# Patient Record
Sex: FEMALE | Race: BLACK OR AFRICAN AMERICAN | NOT HISPANIC OR LATINO | Employment: UNEMPLOYED | URBAN - METROPOLITAN AREA
[De-identification: names, ages, dates, MRNs, and addresses within clinical notes are randomized per-mention and may not be internally consistent; named-entity substitution may affect disease eponyms.]

---

## 2023-03-06 PROBLEM — D64.9 ANEMIA: Status: ACTIVE | Noted: 2023-03-06

## 2023-03-06 PROBLEM — E66.813 OBESITY, CLASS III, BMI 40-49.9 (MORBID OBESITY): Status: ACTIVE | Noted: 2023-03-06

## 2023-03-06 PROBLEM — E07.9 THYROID DISORDER: Status: ACTIVE | Noted: 2023-03-06

## 2023-03-06 PROBLEM — J34.2 DEVIATED NASAL SEPTUM: Status: ACTIVE | Noted: 2023-03-06

## 2023-03-06 PROBLEM — R07.81 PLEURITIC CHEST PAIN: Status: ACTIVE | Noted: 2023-03-06

## 2023-03-06 PROBLEM — H52.11 MYOPIA OF RIGHT EYE: Status: ACTIVE | Noted: 2023-03-06

## 2023-03-06 PROBLEM — D21.9 FIBROID: Status: ACTIVE | Noted: 2023-03-06

## 2023-03-06 PROBLEM — I10 BENIGN ESSENTIAL HYPERTENSION: Status: ACTIVE | Noted: 2023-03-06

## 2023-03-06 PROBLEM — R21 SKIN RASH: Status: ACTIVE | Noted: 2023-03-06

## 2023-03-06 PROBLEM — N93.9 ABNORMAL UTERINE BLEEDING: Status: ACTIVE | Noted: 2023-03-06

## 2023-03-06 PROBLEM — D25.9 UTERINE FIBROID: Status: ACTIVE | Noted: 2023-03-06

## 2023-03-06 PROBLEM — J02.9 SORE THROAT: Status: ACTIVE | Noted: 2023-03-06

## 2023-03-06 PROBLEM — N76.0 VAGINITIS: Status: ACTIVE | Noted: 2023-03-06

## 2023-03-06 PROBLEM — N92.0 MENORRHAGIA: Status: ACTIVE | Noted: 2023-03-06

## 2023-03-06 PROBLEM — R05.9 COUGH: Status: ACTIVE | Noted: 2023-03-06

## 2023-03-06 PROBLEM — H02.403 ACQUIRED PTOSIS OF BOTH EYELIDS: Status: ACTIVE | Noted: 2023-03-06

## 2023-03-06 PROBLEM — R06.83 SNORING: Status: ACTIVE | Noted: 2023-03-06

## 2023-03-06 PROBLEM — R40.0 DAYTIME SOMNOLENCE: Status: ACTIVE | Noted: 2023-03-06

## 2023-03-06 PROBLEM — R09.81 NASAL CONGESTION: Status: ACTIVE | Noted: 2023-03-06

## 2023-03-06 PROBLEM — R03.0 ELEVATED BLOOD-PRESSURE READING, WITHOUT DIAGNOSIS OF HYPERTENSION: Status: ACTIVE | Noted: 2023-03-06

## 2023-03-06 PROBLEM — R53.83 FATIGUE: Status: ACTIVE | Noted: 2023-03-06

## 2023-03-06 PROBLEM — J34.3 HYPERTROPHY OF BOTH INFERIOR NASAL TURBINATES: Status: ACTIVE | Noted: 2023-03-06

## 2023-03-06 PROBLEM — Q10.0: Status: ACTIVE | Noted: 2023-03-06

## 2023-03-06 PROBLEM — D50.9 IRON DEFICIENCY ANEMIA, UNSPECIFIED: Status: ACTIVE | Noted: 2023-03-06

## 2023-03-06 PROBLEM — J20.9 BRONCHITIS WITH BRONCHOSPASM: Status: ACTIVE | Noted: 2023-03-06

## 2023-03-06 PROBLEM — E66.01 OBESITY, CLASS III, BMI 40-49.9 (MORBID OBESITY) (MULTI): Status: ACTIVE | Noted: 2023-03-06

## 2023-03-06 PROBLEM — J34.89 SINUS PRESSURE: Status: ACTIVE | Noted: 2023-03-06

## 2023-03-06 PROBLEM — R51.9 HEADACHE: Status: ACTIVE | Noted: 2023-03-06

## 2023-03-06 PROBLEM — J40 BRONCHITIS: Status: ACTIVE | Noted: 2023-03-06

## 2023-03-06 PROBLEM — H10.13 ALLERGIC CONJUNCTIVITIS, BILATERAL: Status: ACTIVE | Noted: 2023-03-06

## 2023-03-06 PROBLEM — E66.9 ADULT-ONSET OBESITY: Status: ACTIVE | Noted: 2023-03-06

## 2023-03-06 PROBLEM — U07.1 COVID-19: Status: ACTIVE | Noted: 2023-03-06

## 2023-03-06 PROBLEM — H52.202 ASTIGMATISM OF LEFT EYE: Status: ACTIVE | Noted: 2023-03-06

## 2023-03-06 PROBLEM — R73.03 PRE-DIABETES: Status: ACTIVE | Noted: 2023-03-06

## 2023-03-06 PROBLEM — G43.909 MIGRAINES: Status: ACTIVE | Noted: 2023-03-06

## 2023-03-06 PROBLEM — H04.209 EPIPHORA: Status: ACTIVE | Noted: 2023-03-06

## 2023-03-06 RX ORDER — SPIRONOLACTONE 50 MG/1
1 TABLET, FILM COATED ORAL DAILY
COMMUNITY
Start: 2021-08-11 | End: 2024-02-05 | Stop reason: SDUPTHER

## 2023-03-06 RX ORDER — CARVEDILOL 12.5 MG/1
1 TABLET ORAL 2 TIMES DAILY
COMMUNITY
Start: 2021-04-12 | End: 2023-06-08

## 2023-03-06 RX ORDER — ALBUTEROL SULFATE 90 UG/1
1-2 AEROSOL, METERED RESPIRATORY (INHALATION) EVERY 4 HOURS PRN
COMMUNITY
Start: 2015-12-21

## 2023-03-06 RX ORDER — LOSARTAN POTASSIUM AND HYDROCHLOROTHIAZIDE 25; 100 MG/1; MG/1
1 TABLET ORAL DAILY
COMMUNITY
Start: 2020-02-11

## 2023-03-06 RX ORDER — ERGOCALCIFEROL 1.25 MG/1
1 CAPSULE ORAL
COMMUNITY
Start: 2020-01-27 | End: 2024-06-03 | Stop reason: ENTERED-IN-ERROR

## 2023-03-06 RX ORDER — ACETAMINOPHEN 325 MG/1
650 TABLET ORAL EVERY 6 HOURS
COMMUNITY
Start: 2021-08-20 | End: 2024-06-03 | Stop reason: ENTERED-IN-ERROR

## 2023-03-06 RX ORDER — IBUPROFEN 200 MG
600 TABLET ORAL EVERY 6 HOURS PRN
COMMUNITY
Start: 2021-08-20 | End: 2024-06-05 | Stop reason: HOSPADM

## 2023-03-06 RX ORDER — FERROUS SULFATE 325(65) MG
1 TABLET ORAL 2 TIMES DAILY
COMMUNITY
Start: 2021-04-12

## 2023-03-08 ENCOUNTER — APPOINTMENT (OUTPATIENT)
Dept: PRIMARY CARE | Facility: CLINIC | Age: 38
End: 2023-03-08
Payer: COMMERCIAL

## 2023-03-10 ENCOUNTER — TELEMEDICINE (OUTPATIENT)
Dept: PRIMARY CARE | Facility: CLINIC | Age: 38
End: 2023-03-10
Payer: COMMERCIAL

## 2023-03-10 DIAGNOSIS — N92.1 MENORRHAGIA WITH IRREGULAR CYCLE: Primary | ICD-10-CM

## 2023-03-10 PROCEDURE — 99213 OFFICE O/P EST LOW 20 MIN: CPT | Performed by: INTERNAL MEDICINE

## 2023-03-10 ASSESSMENT — ENCOUNTER SYMPTOMS
BOWEL INCONTINENCE: 0
PARESIS: 0
BACK PAIN: 1
PERIANAL NUMBNESS: 0
NUMBNESS: 0
WEIGHT LOSS: 0
DYSURIA: 0
WEAKNESS: 0
HEADACHES: 0
LEG PAIN: 1
TINGLING: 0
PARESTHESIAS: 0
ABDOMINAL PAIN: 1
FEVER: 0

## 2023-03-10 NOTE — PROGRESS NOTES
Chief complaint - symptomatic anemia       Gretel Archibald is a 36 yo F presenting in FU.       1. HTN, severe range, HFpEF per 6.2022 TTE   -Coreg 25 BID   -losart-hctz 100-25   -amlo   -spirono 25    2. Fe Def anemia, menorrhagia s/p myomectomy 8/2021 ([facility]) now with recurrence of menorrhagia in interval   -has had severe bleeding --> with associated dizziness, lightheadedness and very frequend episodes of missed work   [ ]obtain repeat cbc, rfp   [ ]re-referral to gynecology     3. CAP 6/2022     4. ?Mild CKD with normal for race GFRs but mildly elev Cr since 2021       FMLA   weds 9.7.22 - 9.20.22   then 2 times a week intermittent in the past   Now requires more extended leave pending anemia workup   Will bring rosawquan shaw week       A/P   RTO for in person visit next Tuesday to obtain labs. Check BP, given number to schedule gynecology, and complete work related paperwork

## 2023-03-14 ENCOUNTER — APPOINTMENT (OUTPATIENT)
Dept: PRIMARY CARE | Facility: CLINIC | Age: 38
End: 2023-03-14
Payer: COMMERCIAL

## 2023-04-26 ENCOUNTER — APPOINTMENT (OUTPATIENT)
Dept: PRIMARY CARE | Facility: CLINIC | Age: 38
End: 2023-04-26
Payer: COMMERCIAL

## 2023-05-11 ENCOUNTER — TELEPHONE (OUTPATIENT)
Dept: PRIMARY CARE | Facility: CLINIC | Age: 38
End: 2023-05-11
Payer: COMMERCIAL

## 2023-05-12 DIAGNOSIS — I10 BENIGN ESSENTIAL HYPERTENSION: Primary | ICD-10-CM

## 2023-05-12 RX ORDER — AMLODIPINE BESYLATE 10 MG/1
10 TABLET ORAL DAILY
Qty: 30 TABLET | Refills: 5 | Status: SHIPPED | OUTPATIENT
Start: 2023-05-12 | End: 2024-06-03

## 2023-05-24 ENCOUNTER — LAB (OUTPATIENT)
Dept: LAB | Facility: LAB | Age: 38
End: 2023-05-24
Payer: COMMERCIAL

## 2023-05-24 DIAGNOSIS — N92.1 MENORRHAGIA WITH IRREGULAR CYCLE: ICD-10-CM

## 2023-05-24 LAB
ALBUMIN (G/DL) IN SER/PLAS: 4.2 G/DL (ref 3.4–5)
ANION GAP IN SER/PLAS: 13 MMOL/L (ref 10–20)
BASOPHILS (10*3/UL) IN BLOOD BY AUTOMATED COUNT: 0.07 X10E9/L (ref 0–0.1)
BASOPHILS/100 LEUKOCYTES IN BLOOD BY AUTOMATED COUNT: 0.7 % (ref 0–2)
CALCIUM (MG/DL) IN SER/PLAS: 9.4 MG/DL (ref 8.6–10.6)
CARBON DIOXIDE, TOTAL (MMOL/L) IN SER/PLAS: 25 MMOL/L (ref 21–32)
CHLORIDE (MMOL/L) IN SER/PLAS: 103 MMOL/L (ref 98–107)
CREATININE (MG/DL) IN SER/PLAS: 1.38 MG/DL (ref 0.5–1.05)
EOSINOPHILS (10*3/UL) IN BLOOD BY AUTOMATED COUNT: 0.25 X10E9/L (ref 0–0.7)
EOSINOPHILS/100 LEUKOCYTES IN BLOOD BY AUTOMATED COUNT: 2.4 % (ref 0–6)
ERYTHROCYTE DISTRIBUTION WIDTH (RATIO) BY AUTOMATED COUNT: 30.7 % (ref 11.5–14.5)
ERYTHROCYTE MEAN CORPUSCULAR HEMOGLOBIN CONCENTRATION (G/DL) BY AUTOMATED: 24.9 G/DL (ref 32–36)
ERYTHROCYTE MEAN CORPUSCULAR VOLUME (FL) BY AUTOMATED COUNT: 77 FL (ref 80–100)
ERYTHROCYTES (10*6/UL) IN BLOOD BY AUTOMATED COUNT: 4.31 X10E12/L (ref 4–5.2)
FERRITIN (UG/LL) IN SER/PLAS: 74 UG/L (ref 8–150)
GFR FEMALE: 50 ML/MIN/1.73M2
GLUCOSE (MG/DL) IN SER/PLAS: 79 MG/DL (ref 74–99)
HEMATOCRIT (%) IN BLOOD BY AUTOMATED COUNT: 33.4 % (ref 36–46)
HEMOGLOBIN (G/DL) IN BLOOD: 8.3 G/DL (ref 12–16)
IMMATURE GRANULOCYTES/100 LEUKOCYTES IN BLOOD BY AUTOMATED COUNT: 0.6 % (ref 0–0.9)
IRON (UG/DL) IN SER/PLAS: 407 UG/DL (ref 35–150)
IRON BINDING CAPACITY (UG/DL) IN SER/PLAS: <462 UG/DL (ref 240–445)
IRON SATURATION (%) IN SER/PLAS: ABNORMAL % (ref 25–45)
LEUKOCYTES (10*3/UL) IN BLOOD BY AUTOMATED COUNT: 10.2 X10E9/L (ref 4.4–11.3)
LYMPHOCYTES (10*3/UL) IN BLOOD BY AUTOMATED COUNT: 2.97 X10E9/L (ref 1.2–4.8)
LYMPHOCYTES/100 LEUKOCYTES IN BLOOD BY AUTOMATED COUNT: 29 % (ref 13–44)
MONOCYTES (10*3/UL) IN BLOOD BY AUTOMATED COUNT: 0.61 X10E9/L (ref 0.1–1)
MONOCYTES/100 LEUKOCYTES IN BLOOD BY AUTOMATED COUNT: 6 % (ref 2–10)
NEUTROPHILS (10*3/UL) IN BLOOD BY AUTOMATED COUNT: 6.27 X10E9/L (ref 1.2–7.7)
NEUTROPHILS/100 LEUKOCYTES IN BLOOD BY AUTOMATED COUNT: 61.3 % (ref 40–80)
NRBC (PER 100 WBCS) BY AUTOMATED COUNT: 0 /100 WBC (ref 0–0)
PHOSPHATE (MG/DL) IN SER/PLAS: 4 MG/DL (ref 2.5–4.9)
PLATELETS (10*3/UL) IN BLOOD AUTOMATED COUNT: 432 X10E9/L (ref 150–450)
POTASSIUM (MMOL/L) IN SER/PLAS: 4.2 MMOL/L (ref 3.5–5.3)
SODIUM (MMOL/L) IN SER/PLAS: 137 MMOL/L (ref 136–145)
UREA NITROGEN (MG/DL) IN SER/PLAS: 23 MG/DL (ref 6–23)

## 2023-05-24 PROCEDURE — 85025 COMPLETE CBC W/AUTO DIFF WBC: CPT

## 2023-05-24 PROCEDURE — 36415 COLL VENOUS BLD VENIPUNCTURE: CPT

## 2023-05-24 PROCEDURE — 80069 RENAL FUNCTION PANEL: CPT

## 2023-05-24 PROCEDURE — 82728 ASSAY OF FERRITIN: CPT

## 2023-05-24 PROCEDURE — 83550 IRON BINDING TEST: CPT

## 2023-05-24 PROCEDURE — 83540 ASSAY OF IRON: CPT

## 2023-05-25 ENCOUNTER — TELEPHONE (OUTPATIENT)
Dept: PRIMARY CARE | Facility: CLINIC | Age: 38
End: 2023-05-25
Payer: COMMERCIAL

## 2023-06-08 ENCOUNTER — OFFICE VISIT (OUTPATIENT)
Dept: PRIMARY CARE | Facility: CLINIC | Age: 38
End: 2023-06-08
Payer: COMMERCIAL

## 2023-06-08 VITALS — SYSTOLIC BLOOD PRESSURE: 117 MMHG | DIASTOLIC BLOOD PRESSURE: 77 MMHG | HEART RATE: 65 BPM

## 2023-06-08 DIAGNOSIS — N92.4 EXCESSIVE BLEEDING IN PREMENOPAUSAL PERIOD: Primary | ICD-10-CM

## 2023-06-08 PROCEDURE — 99214 OFFICE O/P EST MOD 30 MIN: CPT | Performed by: INTERNAL MEDICINE

## 2023-06-08 PROCEDURE — 3078F DIAST BP <80 MM HG: CPT | Performed by: INTERNAL MEDICINE

## 2023-06-08 PROCEDURE — 3074F SYST BP LT 130 MM HG: CPT | Performed by: INTERNAL MEDICINE

## 2023-06-08 RX ORDER — CARVEDILOL 25 MG/1
25 TABLET ORAL
COMMUNITY
End: 2023-06-22 | Stop reason: SDUPTHER

## 2023-06-08 NOTE — PATIENT INSTRUCTIONS
Gretel,     Call the referral line to schedule gynecology referral and strongly consider an IUD!   Keep the iron twice a day.   Do repeat labs when Mario does in a month to recheck the blood counts.     CL

## 2023-06-08 NOTE — PROGRESS NOTES
Gretel Archibald is a 36 yo F presenting in FU.       1. HTN, severe range, HFpEF per 6.2022 TTE   -Coreg 25 BID   -losart-hctz 100-25   -amlo 10   -spirono 25     2. Fe Def anemia, menorrhagia s/p myomectomy 8/2021 ([facility]) now with recurrence of menorrhagia in interval   -has had severe bleeding --> with associated dizziness, lightheadedness and very frequend episodes of missed work   *5.24.23 Hb 8.3 with elevated iron   -elevated iron on 5.23 labs - takign 2 tabs of iron per day   [ ]referral to gynecology       3. CAP 6/2022      4. ?Mild CKD with normal for race GFRs but mildly elev Cr since 2021   -GFR 50 per 5.23 labs     ROS   Gen neg fever neg chills   CV neg CP neg palpiations     Gen Aox3 NAD well appearing   HEENT mmm pharynx clear no cervical LAD   Eyes sclerae clear   CV rrr nl s1/2 no m/r/g           A/P       1. HTN, severe range, HFpEF per 6.2022 TTE   -Coreg 25 BID   -losart-hctz 100-25   -amlo 10   -spirono 25  -very well controlled today   -continue present meds      2. Fe Def anemia, menorrhagia s/p myomectomy 8/2021 ([facility]) now with recurrence of menorrhagia in interval   -has had severe bleeding --> with associated dizziness, lightheadedness and very frequend episodes of missed work   *5.24.23 Hb 8.3 with elevated iron   -elevated iron on 5.23 labs - takign 2 tabs of iron per day   [ ]referral to gynecology       3. CAP 6/2022      4. ?Mild CKD with normal for race GFRs but mildly elev Cr since 2021   -GFR 50 per 5.23 labs         ROS   Gen neg malaise   Neuro neg ha neg dizziness  CV neg CP neg palpitations

## 2023-06-22 DIAGNOSIS — I10 BENIGN ESSENTIAL HYPERTENSION: ICD-10-CM

## 2023-06-22 RX ORDER — CARVEDILOL 25 MG/1
25 TABLET ORAL
Qty: 180 TABLET | Refills: 3 | Status: SHIPPED | OUTPATIENT
Start: 2023-06-22

## 2023-08-29 ENCOUNTER — LAB (OUTPATIENT)
Dept: LAB | Facility: LAB | Age: 38
End: 2023-08-29
Payer: COMMERCIAL

## 2023-08-29 ENCOUNTER — OFFICE VISIT (OUTPATIENT)
Dept: PRIMARY CARE | Facility: CLINIC | Age: 38
End: 2023-08-29
Payer: COMMERCIAL

## 2023-08-29 VITALS — HEART RATE: 72 BPM | OXYGEN SATURATION: 99 %

## 2023-08-29 DIAGNOSIS — Z00.00 ROUTINE GENERAL MEDICAL EXAMINATION AT A HEALTH CARE FACILITY: ICD-10-CM

## 2023-08-29 DIAGNOSIS — Z00.00 ROUTINE GENERAL MEDICAL EXAMINATION AT A HEALTH CARE FACILITY: Primary | ICD-10-CM

## 2023-08-29 DIAGNOSIS — N92.4 EXCESSIVE BLEEDING IN PREMENOPAUSAL PERIOD: ICD-10-CM

## 2023-08-29 DIAGNOSIS — G60.8 ACUTE SENSORY NEUROPATHY: ICD-10-CM

## 2023-08-29 LAB
ALANINE AMINOTRANSFERASE (SGPT) (U/L) IN SER/PLAS: 10 U/L (ref 7–45)
ALBUMIN (G/DL) IN SER/PLAS: 4.2 G/DL (ref 3.4–5)
ALKALINE PHOSPHATASE (U/L) IN SER/PLAS: 67 U/L (ref 33–110)
ANION GAP IN SER/PLAS: 15 MMOL/L (ref 10–20)
ASPARTATE AMINOTRANSFERASE (SGOT) (U/L) IN SER/PLAS: 11 U/L (ref 9–39)
BASOPHILS (10*3/UL) IN BLOOD BY AUTOMATED COUNT: 0.06 X10E9/L (ref 0–0.1)
BASOPHILS/100 LEUKOCYTES IN BLOOD BY AUTOMATED COUNT: 0.4 % (ref 0–2)
BILIRUBIN TOTAL (MG/DL) IN SER/PLAS: 0.3 MG/DL (ref 0–1.2)
CALCIUM (MG/DL) IN SER/PLAS: 9.6 MG/DL (ref 8.6–10.6)
CARBON DIOXIDE, TOTAL (MMOL/L) IN SER/PLAS: 26 MMOL/L (ref 21–32)
CHLORIDE (MMOL/L) IN SER/PLAS: 104 MMOL/L (ref 98–107)
CREATININE (MG/DL) IN SER/PLAS: 1.16 MG/DL (ref 0.5–1.05)
EOSINOPHILS (10*3/UL) IN BLOOD BY AUTOMATED COUNT: 0.39 X10E9/L (ref 0–0.7)
EOSINOPHILS/100 LEUKOCYTES IN BLOOD BY AUTOMATED COUNT: 2.8 % (ref 0–6)
ERYTHROCYTE DISTRIBUTION WIDTH (RATIO) BY AUTOMATED COUNT: 19.6 % (ref 11.5–14.5)
ERYTHROCYTE MEAN CORPUSCULAR HEMOGLOBIN CONCENTRATION (G/DL) BY AUTOMATED: 29.1 G/DL (ref 32–36)
ERYTHROCYTE MEAN CORPUSCULAR VOLUME (FL) BY AUTOMATED COUNT: 86 FL (ref 80–100)
ERYTHROCYTES (10*6/UL) IN BLOOD BY AUTOMATED COUNT: 3.67 X10E12/L (ref 4–5.2)
ESTIMATED AVERAGE GLUCOSE FOR HBA1C: 80 MG/DL
GFR FEMALE: 62 ML/MIN/1.73M2
GLUCOSE (MG/DL) IN SER/PLAS: 88 MG/DL (ref 74–99)
HEMATOCRIT (%) IN BLOOD BY AUTOMATED COUNT: 31.6 % (ref 36–46)
HEMOGLOBIN (G/DL) IN BLOOD: 9.2 G/DL (ref 12–16)
HEMOGLOBIN A1C/HEMOGLOBIN TOTAL IN BLOOD: 4.4 %
IMMATURE GRANULOCYTES/100 LEUKOCYTES IN BLOOD BY AUTOMATED COUNT: 2.4 % (ref 0–0.9)
IRON (UG/DL) IN SER/PLAS: 262 UG/DL (ref 35–150)
IRON BINDING CAPACITY (UG/DL) IN SER/PLAS: 385 UG/DL (ref 240–445)
IRON SATURATION (%) IN SER/PLAS: 68 % (ref 25–45)
LEUKOCYTES (10*3/UL) IN BLOOD BY AUTOMATED COUNT: 13.7 X10E9/L (ref 4.4–11.3)
LYMPHOCYTES (10*3/UL) IN BLOOD BY AUTOMATED COUNT: 2.67 X10E9/L (ref 1.2–4.8)
LYMPHOCYTES/100 LEUKOCYTES IN BLOOD BY AUTOMATED COUNT: 19.4 % (ref 13–44)
MONOCYTES (10*3/UL) IN BLOOD BY AUTOMATED COUNT: 0.66 X10E9/L (ref 0.1–1)
MONOCYTES/100 LEUKOCYTES IN BLOOD BY AUTOMATED COUNT: 4.8 % (ref 2–10)
NEUTROPHILS (10*3/UL) IN BLOOD BY AUTOMATED COUNT: 9.63 X10E9/L (ref 1.2–7.7)
NEUTROPHILS/100 LEUKOCYTES IN BLOOD BY AUTOMATED COUNT: 70.2 % (ref 40–80)
NRBC (PER 100 WBCS) BY AUTOMATED COUNT: 0.4 /100 WBC (ref 0–0)
PLATELETS (10*3/UL) IN BLOOD AUTOMATED COUNT: 453 X10E9/L (ref 150–450)
POTASSIUM (MMOL/L) IN SER/PLAS: 4.1 MMOL/L (ref 3.5–5.3)
PROTEIN TOTAL: 7.2 G/DL (ref 6.4–8.2)
SEDIMENTATION RATE, ERYTHROCYTE: 30 MM/H (ref 0–20)
SODIUM (MMOL/L) IN SER/PLAS: 141 MMOL/L (ref 136–145)
UREA NITROGEN (MG/DL) IN SER/PLAS: 12 MG/DL (ref 6–23)

## 2023-08-29 PROCEDURE — 36415 COLL VENOUS BLD VENIPUNCTURE: CPT

## 2023-08-29 PROCEDURE — 83036 HEMOGLOBIN GLYCOSYLATED A1C: CPT

## 2023-08-29 PROCEDURE — 85652 RBC SED RATE AUTOMATED: CPT

## 2023-08-29 PROCEDURE — 83540 ASSAY OF IRON: CPT

## 2023-08-29 PROCEDURE — 99214 OFFICE O/P EST MOD 30 MIN: CPT | Performed by: INTERNAL MEDICINE

## 2023-08-29 PROCEDURE — 80053 COMPREHEN METABOLIC PANEL: CPT

## 2023-08-29 PROCEDURE — 85025 COMPLETE CBC W/AUTO DIFF WBC: CPT

## 2023-08-29 PROCEDURE — 83550 IRON BINDING TEST: CPT

## 2023-08-29 RX ORDER — METHYLPREDNISOLONE 4 MG/1
TABLET ORAL
Qty: 21 TABLET | Refills: 0 | Status: SHIPPED | OUTPATIENT
Start: 2023-08-29 | End: 2023-09-05

## 2023-08-29 ASSESSMENT — ENCOUNTER SYMPTOMS
FATIGUE: 0
WOUND: 0
ACTIVITY CHANGE: 0
FREQUENCY: 1
SHORTNESS OF BREATH: 0
NUMBNESS: 1
DIZZINESS: 0
WEAKNESS: 0
HEADACHES: 1
LIGHT-HEADEDNESS: 0
COLOR CHANGE: 0
APPETITE CHANGE: 0
BACK PAIN: 0
MYALGIAS: 0
FEVER: 0
EYE PAIN: 0
RHINORRHEA: 0
COUGH: 0
NECK STIFFNESS: 0
PALPITATIONS: 0
JOINT SWELLING: 0
CHILLS: 0
VOICE CHANGE: 0
NECK PAIN: 0
NAUSEA: 1

## 2023-08-29 NOTE — PROGRESS NOTES
I reviewed with the resident the medical history and the resident’s findings on physical examination.  I discussed with the resident the patient’s diagnosis and concur with the treatment plan as documented in the resident note.     Suspect radiculopathy.   Trial Medrol gm.   PT if fails.   Repeat labs and urged gyne FU for severe anemia last Hb 8.3     Shruti Griffith MD

## 2023-08-29 NOTE — PATIENT INSTRUCTIONS
Iesha,     Do lab work today to check sugar, inflammation levels and to check on your anemia.    Try the prednisone I sent to [facility] for a week.   If you don't get better, Lmt message me and we will talk next steps (these can include physical therapy, more invasive nerve testing or other meds for nerve pain if the prednisone fails).     CL

## 2023-08-29 NOTE — PROGRESS NOTES
"Subjective   Patient ID: Gretel Archibald is a 37 y.o. female who presents for five days of leg numbness.    HPI  36 yo F with HTN, HFpEF, Iron deficiency anemia, menorrhagia s/p myomectomy 8/2021 ([facility]) now with recurrence of menorrhagia in interval, mild CKD, pre-diabetes presenting with 5 days of leg numbness.     Patient report last Monday, she initially had R neck pain which has since resolved. This was followed by intermittent lower back burning sensation without radiation, which has also since resolved. However, during both of these episodes, she notes that her legs feel numb like they have \"Fallen asleep\". Patient describes the location of this numbness on her anterior thigh bilaterally, that has now spread to the shin, and to the soles of her feet as of this morning. Sensation worsens when she is still, but improved with active movement and Advil. She denies recent illnesses, weight loss, or any change in strength, gait, or ROM. She endorses one episode of nausea/vomiting last week with increased urination.     Regarding anemia 2/2 menorrhagia, patient following up with Dr. Bradford to place IUD (though has been inhibited due to continuous bleeding). Patient reports compliance with iron and blood pressure medication.     [facility]involves [facility])    Review of Systems   Constitutional:  Negative for activity change, appetite change, chills, fatigue and fever.   HENT:  Negative for rhinorrhea and voice change.    Eyes:  Negative for pain and visual disturbance.   Respiratory:  Negative for cough and shortness of breath.    Cardiovascular:  Negative for chest pain, palpitations and leg swelling.   Gastrointestinal:  Positive for nausea.        One episode of nausea/vomiting in last week   Genitourinary:  Positive for frequency and vaginal bleeding.   Musculoskeletal:  Negative for back pain, gait problem, joint swelling, myalgias, neck pain and neck stiffness.   Skin:  " Negative for color change, rash and wound.   Neurological:  Positive for numbness and headaches. Negative for dizziness, weakness and light-headedness.       Objective   HR 72 Pulse ox 99% on RA  Physical Exam  Constitutional:       Appearance: She is obese.   HENT:      Head: Normocephalic and atraumatic.      Nose: Nose normal.      Mouth/Throat:      Mouth: Mucous membranes are moist.   Eyes:      Extraocular Movements: Extraocular movements intact.      Pupils: Pupils are equal, round, and reactive to light.   Cardiovascular:      Rate and Rhythm: Normal rate and regular rhythm.      Pulses: Normal pulses.      Heart sounds: Normal heart sounds.   Pulmonary:      Effort: Pulmonary effort is normal.      Breath sounds: Normal breath sounds.   Abdominal:      Palpations: Abdomen is soft.   Musculoskeletal:         General: Normal range of motion.      Right lower leg: No edema.      Left lower leg: No edema.        Legs:    Skin:     General: Skin is warm and dry.      Capillary Refill: Capillary refill takes less than 2 seconds.   Neurological:      General: No focal deficit present.      Mental Status: She is alert and oriented to person, place, and time.      Sensory: Sensory deficit present.      Motor: No weakness.      Coordination: Coordination normal.      Gait: Gait normal.      Comments: Numbness over anterior thighs bilaterally, anterior shins, soles of feet       Assessment/Plan   38 yo F with HTN, HFpEF, Iron deficiency anemia, menorrhagia s/p myomectomy 8/2021 ([facility]) now with recurrence of menorrhagia in interval, mild CKD, pre-diabetes presenting with 5 days of anterior leg numbness without radiation, weakness, change in sensation/ROM. Differential includes peripheral neuropathy possibly 2/2 diabetes, medication side effect (patient's medications less known for side effect of neuropathy), sciatica (less likely given anterior distribution), compressive neuropathy (no clear affected dermatome), or  non specific neuropathy. Will f/u lab work; if not concerning for diabetes, will prescribe prednisone burst for non specific neuropathy as well as consider diagnostic EMG and therapuetic gabapentin.     #Unspecific bilateral anterior leg numbness  -F/u A1c, RFP, POCT  -If low suspicion for diabetes, consider prednisone burst  -If no relief on prednisone trial, consider EMG and gabapentin    Discussed with Dr. Biggs.    Problem List Items Addressed This Visit    None

## 2023-09-05 DIAGNOSIS — M54.16 ACUTE LUMBAR RADICULOPATHY: Primary | ICD-10-CM

## 2023-09-06 ENCOUNTER — TELEPHONE (OUTPATIENT)
Dept: PRIMARY CARE | Facility: CLINIC | Age: 38
End: 2023-09-06
Payer: COMMERCIAL

## 2023-09-06 NOTE — TELEPHONE ENCOUNTER
Patient said she is still in pain , the steroid is not working, also she want labs results, she said you can message her on my chart

## 2023-09-08 ENCOUNTER — TELEPHONE (OUTPATIENT)
Dept: PRIMARY CARE | Facility: CLINIC | Age: 38
End: 2023-09-08
Payer: COMMERCIAL

## 2023-09-08 NOTE — TELEPHONE ENCOUNTER
Patient want to know will the referral for pt work, she dont have pain anymore just numbness on legs and bottom of feet

## 2023-09-11 DIAGNOSIS — G60.8 ACUTE SENSORY NEUROPATHY: Primary | ICD-10-CM

## 2023-12-27 ENCOUNTER — APPOINTMENT (OUTPATIENT)
Dept: PRIMARY CARE | Facility: CLINIC | Age: 38
End: 2023-12-27
Payer: COMMERCIAL

## 2023-12-27 ASSESSMENT — LIFESTYLE VARIABLES: HISTORY_OF_SMOKING: I AM A CURRENT SMOKER

## 2023-12-29 ENCOUNTER — TELEMEDICINE (OUTPATIENT)
Dept: PRIMARY CARE | Facility: CLINIC | Age: 38
End: 2023-12-29
Payer: COMMERCIAL

## 2023-12-29 DIAGNOSIS — J02.9 PHARYNGITIS, UNSPECIFIED ETIOLOGY: ICD-10-CM

## 2023-12-29 DIAGNOSIS — J45.21 MILD INTERMITTENT ASTHMATIC BRONCHITIS WITH ACUTE EXACERBATION (HHS-HCC): ICD-10-CM

## 2023-12-29 DIAGNOSIS — Z00.00 HEALTH CARE MAINTENANCE: Primary | ICD-10-CM

## 2023-12-29 PROCEDURE — 99213 OFFICE O/P EST LOW 20 MIN: CPT | Performed by: INTERNAL MEDICINE

## 2023-12-29 RX ORDER — AMOXICILLIN 875 MG/1
875 TABLET, FILM COATED ORAL 2 TIMES DAILY
Qty: 10 TABLET | Refills: 0 | Status: SHIPPED | OUTPATIENT
Start: 2023-12-29 | End: 2024-01-03

## 2023-12-29 NOTE — PROGRESS NOTES
Subjective   Patient ID: Gretel Archibald is a 38 y.o. female who presents for No chief complaint on file..    HPI  virtual video visit this visit was completed via audio and visual secondary to the restrictions of the COVID-19 pandemic all medical issues were addressed and discussed with patient patient was not otherwise examined if it was felt that the patient needed to be seen in the office they would have been referred to do so patient verbally consented to visit  Sick visit been going back and forth with some upper respiratory tract symptoms had initially tested negative for COVID now with sore throat feels that tonsils are swollen felt that tongue was swollen but no longer so no chest pain no shortness of breath    Review of Systems    Objective   There were no vitals taken for this visit.    Physical Exam alert and oriented x 3 NCAT no coryza nares without discharge OP benign erythema she self palpated some lymph tissue on the left mandible breathing unlabored not otherwise examined    Assessment/Plan  impression asthma/upper respiratory tract infection with a sore throat other diagnoses  Plan has had bronchospasm in the past had albuterol at home may use if needed okay for prescription Amoxil 875 mg 1 p.o. twice daily #10 refill 0 okay for Mucinex twice daily okay for Tylenol or Motrin as able to take 2-3 times per day good nutrition increase water consumption sw gargle cepacol lozenges proper rest continue with other medications and recheck based on above Endor if no better

## 2024-02-05 DIAGNOSIS — I10 BENIGN ESSENTIAL HYPERTENSION: ICD-10-CM

## 2024-02-05 RX ORDER — SPIRONOLACTONE 50 MG/1
50 TABLET, FILM COATED ORAL DAILY
Qty: 90 TABLET | Refills: 0 | Status: SHIPPED | OUTPATIENT
Start: 2024-02-05

## 2024-06-02 ENCOUNTER — APPOINTMENT (OUTPATIENT)
Dept: PRIMARY CARE | Facility: CLINIC | Age: 39
End: 2024-06-02

## 2024-06-03 ENCOUNTER — APPOINTMENT (OUTPATIENT)
Dept: CARDIOLOGY | Facility: HOSPITAL | Age: 39
End: 2024-06-03

## 2024-06-03 ENCOUNTER — APPOINTMENT (OUTPATIENT)
Dept: RADIOLOGY | Facility: HOSPITAL | Age: 39
End: 2024-06-03

## 2024-06-03 ENCOUNTER — HOSPITAL ENCOUNTER (INPATIENT)
Facility: HOSPITAL | Age: 39
LOS: 2 days | Discharge: HOME | End: 2024-06-05
Attending: EMERGENCY MEDICINE | Admitting: INTERNAL MEDICINE

## 2024-06-03 DIAGNOSIS — N93.9 VAGINAL BLEEDING: ICD-10-CM

## 2024-06-03 DIAGNOSIS — R79.89 ELEVATED TROPONIN: ICD-10-CM

## 2024-06-03 DIAGNOSIS — D64.9 ANEMIA, UNSPECIFIED TYPE: Primary | ICD-10-CM

## 2024-06-03 PROBLEM — J45.901 ASTHMATIC BRONCHITIS WITH EXACERBATION (HHS-HCC): Status: ACTIVE | Noted: 2024-06-03

## 2024-06-03 PROBLEM — N17.9 ACUTE ON CHRONIC KIDNEY FAILURE (CMS-HCC): Status: ACTIVE | Noted: 2024-06-03

## 2024-06-03 PROBLEM — R06.02 SHORTNESS OF BREATH: Status: ACTIVE | Noted: 2024-06-03

## 2024-06-03 PROBLEM — N18.9 ACUTE ON CHRONIC KIDNEY FAILURE (CMS-HCC): Status: ACTIVE | Noted: 2024-06-03

## 2024-06-03 PROBLEM — R07.9 CHEST PAIN: Status: ACTIVE | Noted: 2024-06-03

## 2024-06-03 LAB
ABO GROUP (TYPE) IN BLOOD: NORMAL
ALBUMIN SERPL BCP-MCNC: 3.8 G/DL (ref 3.4–5)
ALP SERPL-CCNC: 72 U/L (ref 33–110)
ALT SERPL W P-5'-P-CCNC: 30 U/L (ref 7–45)
ANION GAP SERPL CALC-SCNC: 12 MMOL/L (ref 10–20)
ANTIBODY SCREEN: NORMAL
AST SERPL W P-5'-P-CCNC: 25 U/L (ref 9–39)
B-HCG SERPL-ACNC: <2 MIU/ML
BASOPHILS # BLD AUTO: 0.05 X10*3/UL (ref 0–0.1)
BASOPHILS NFR BLD AUTO: 0.4 %
BILIRUB SERPL-MCNC: 0.5 MG/DL (ref 0–1.2)
BNP SERPL-MCNC: 268 PG/ML (ref 0–99)
BUN SERPL-MCNC: 12 MG/DL (ref 6–23)
CALCIUM SERPL-MCNC: 8.6 MG/DL (ref 8.6–10.3)
CARDIAC TROPONIN I PNL SERPL HS: 21 NG/L (ref 0–13)
CARDIAC TROPONIN I PNL SERPL HS: 22 NG/L (ref 0–13)
CARDIAC TROPONIN I PNL SERPL HS: 26 NG/L (ref 0–13)
CHLORIDE SERPL-SCNC: 110 MMOL/L (ref 98–107)
CO2 SERPL-SCNC: 24 MMOL/L (ref 21–32)
CREAT SERPL-MCNC: 1.44 MG/DL (ref 0.5–1.05)
DACRYOCYTES BLD QL SMEAR: NORMAL
EGFRCR SERPLBLD CKD-EPI 2021: 48 ML/MIN/1.73M*2
EOSINOPHIL # BLD AUTO: 0.51 X10*3/UL (ref 0–0.7)
EOSINOPHIL NFR BLD AUTO: 4.1 %
ERYTHROCYTE [DISTWIDTH] IN BLOOD BY AUTOMATED COUNT: 27.6 % (ref 11.5–14.5)
GLUCOSE SERPL-MCNC: 99 MG/DL (ref 74–99)
HCT VFR BLD AUTO: 18.6 % (ref 36–46)
HGB BLD-MCNC: 4.2 G/DL (ref 12–16)
HYPOCHROMIA BLD QL SMEAR: NORMAL
IMM GRANULOCYTES # BLD AUTO: 0.53 X10*3/UL (ref 0–0.7)
IMM GRANULOCYTES NFR BLD AUTO: 4.3 % (ref 0–0.9)
LYMPHOCYTES # BLD AUTO: 2.09 X10*3/UL (ref 1.2–4.8)
LYMPHOCYTES NFR BLD AUTO: 16.9 %
MAGNESIUM SERPL-MCNC: 2.4 MG/DL (ref 1.6–2.4)
MCH RBC QN AUTO: 15.1 PG (ref 26–34)
MCHC RBC AUTO-ENTMCNC: 22.6 G/DL (ref 32–36)
MCV RBC AUTO: 67 FL (ref 80–100)
MONOCYTES # BLD AUTO: 0.83 X10*3/UL (ref 0.1–1)
MONOCYTES NFR BLD AUTO: 6.7 %
NEUTROPHILS # BLD AUTO: 8.34 X10*3/UL (ref 1.2–7.7)
NEUTROPHILS NFR BLD AUTO: 67.6 %
NRBC BLD-RTO: 1.9 /100 WBCS (ref 0–0)
OVALOCYTES BLD QL SMEAR: NORMAL
PLATELET # BLD AUTO: 496 X10*3/UL (ref 150–450)
POLYCHROMASIA BLD QL SMEAR: NORMAL
POTASSIUM SERPL-SCNC: 3.9 MMOL/L (ref 3.5–5.3)
PROT SERPL-MCNC: 7 G/DL (ref 6.4–8.2)
RBC # BLD AUTO: 2.79 X10*6/UL (ref 4–5.2)
RBC MORPH BLD: NORMAL
RH FACTOR (ANTIGEN D): NORMAL
SCHISTOCYTES BLD QL SMEAR: NORMAL
SODIUM SERPL-SCNC: 142 MMOL/L (ref 136–145)
WBC # BLD AUTO: 12.4 X10*3/UL (ref 4.4–11.3)

## 2024-06-03 PROCEDURE — 83735 ASSAY OF MAGNESIUM: CPT | Performed by: NURSE PRACTITIONER

## 2024-06-03 PROCEDURE — 84484 ASSAY OF TROPONIN QUANT: CPT | Performed by: NURSE PRACTITIONER

## 2024-06-03 PROCEDURE — 2060000001 HC INTERMEDIATE ICU ROOM DAILY

## 2024-06-03 PROCEDURE — 86900 BLOOD TYPING SEROLOGIC ABO: CPT | Performed by: PHYSICIAN ASSISTANT

## 2024-06-03 PROCEDURE — 94640 AIRWAY INHALATION TREATMENT: CPT

## 2024-06-03 PROCEDURE — 84702 CHORIONIC GONADOTROPIN TEST: CPT | Performed by: PHYSICIAN ASSISTANT

## 2024-06-03 PROCEDURE — 2500000002 HC RX 250 W HCPCS SELF ADMINISTERED DRUGS (ALT 637 FOR MEDICARE OP, ALT 636 FOR OP/ED): Performed by: PHYSICIAN ASSISTANT

## 2024-06-03 PROCEDURE — 99291 CRITICAL CARE FIRST HOUR: CPT | Mod: 25 | Performed by: EMERGENCY MEDICINE

## 2024-06-03 PROCEDURE — 93005 ELECTROCARDIOGRAM TRACING: CPT

## 2024-06-03 PROCEDURE — 36415 COLL VENOUS BLD VENIPUNCTURE: CPT | Performed by: NURSE PRACTITIONER

## 2024-06-03 PROCEDURE — 84484 ASSAY OF TROPONIN QUANT: CPT | Performed by: PHYSICIAN ASSISTANT

## 2024-06-03 PROCEDURE — 83880 ASSAY OF NATRIURETIC PEPTIDE: CPT | Performed by: NURSE PRACTITIONER

## 2024-06-03 PROCEDURE — 71045 X-RAY EXAM CHEST 1 VIEW: CPT

## 2024-06-03 PROCEDURE — 2500000004 HC RX 250 GENERAL PHARMACY W/ HCPCS (ALT 636 FOR OP/ED): Performed by: NURSE PRACTITIONER

## 2024-06-03 PROCEDURE — P9016 RBC LEUKOCYTES REDUCED: HCPCS

## 2024-06-03 PROCEDURE — 86901 BLOOD TYPING SEROLOGIC RH(D): CPT | Performed by: PHYSICIAN ASSISTANT

## 2024-06-03 PROCEDURE — 2500000001 HC RX 250 WO HCPCS SELF ADMINISTERED DRUGS (ALT 637 FOR MEDICARE OP): Performed by: PHYSICIAN ASSISTANT

## 2024-06-03 PROCEDURE — 85025 COMPLETE CBC W/AUTO DIFF WBC: CPT | Performed by: NURSE PRACTITIONER

## 2024-06-03 PROCEDURE — 99223 1ST HOSP IP/OBS HIGH 75: CPT | Performed by: PHYSICIAN ASSISTANT

## 2024-06-03 PROCEDURE — 86920 COMPATIBILITY TEST SPIN: CPT

## 2024-06-03 PROCEDURE — 80053 COMPREHEN METABOLIC PANEL: CPT | Performed by: NURSE PRACTITIONER

## 2024-06-03 PROCEDURE — 36430 TRANSFUSION BLD/BLD COMPNT: CPT

## 2024-06-03 PROCEDURE — 71045 X-RAY EXAM CHEST 1 VIEW: CPT | Mod: FOREIGN READ | Performed by: RADIOLOGY

## 2024-06-03 RX ORDER — CARVEDILOL 12.5 MG/1
25 TABLET ORAL
Status: DISCONTINUED | OUTPATIENT
Start: 2024-06-04 | End: 2024-06-03

## 2024-06-03 RX ORDER — TALC
3 POWDER (GRAM) TOPICAL NIGHTLY PRN
Status: DISCONTINUED | OUTPATIENT
Start: 2024-06-03 | End: 2024-06-05 | Stop reason: HOSPADM

## 2024-06-03 RX ORDER — IPRATROPIUM BROMIDE AND ALBUTEROL SULFATE 2.5; .5 MG/3ML; MG/3ML
3 SOLUTION RESPIRATORY (INHALATION)
Status: DISCONTINUED | OUTPATIENT
Start: 2024-06-04 | End: 2024-06-05 | Stop reason: HOSPADM

## 2024-06-03 RX ORDER — ACETAMINOPHEN 160 MG/5ML
650 SUSPENSION ORAL EVERY 4 HOURS PRN
Status: DISCONTINUED | OUTPATIENT
Start: 2024-06-03 | End: 2024-06-05 | Stop reason: HOSPADM

## 2024-06-03 RX ORDER — BENZONATATE 100 MG/1
200 CAPSULE ORAL 3 TIMES DAILY PRN
Status: DISCONTINUED | OUTPATIENT
Start: 2024-06-03 | End: 2024-06-05 | Stop reason: HOSPADM

## 2024-06-03 RX ORDER — ALBUTEROL SULFATE 0.83 MG/ML
2.5 SOLUTION RESPIRATORY (INHALATION) EVERY 2 HOUR PRN
Status: DISCONTINUED | OUTPATIENT
Start: 2024-06-03 | End: 2024-06-05 | Stop reason: HOSPADM

## 2024-06-03 RX ORDER — AMLODIPINE BESYLATE 10 MG/1
10 TABLET ORAL DAILY
Status: DISCONTINUED | OUTPATIENT
Start: 2024-06-04 | End: 2024-06-05 | Stop reason: HOSPADM

## 2024-06-03 RX ORDER — SPIRONOLACTONE 25 MG/1
50 TABLET ORAL DAILY
Status: DISCONTINUED | OUTPATIENT
Start: 2024-06-04 | End: 2024-06-05 | Stop reason: HOSPADM

## 2024-06-03 RX ORDER — ENOXAPARIN SODIUM 100 MG/ML
40 INJECTION SUBCUTANEOUS EVERY 12 HOURS SCHEDULED
Status: DISCONTINUED | OUTPATIENT
Start: 2024-06-03 | End: 2024-06-03

## 2024-06-03 RX ORDER — NAPROXEN SODIUM 220 MG/1
324 TABLET, FILM COATED ORAL ONCE
Status: DISCONTINUED | OUTPATIENT
Start: 2024-06-03 | End: 2024-06-03

## 2024-06-03 RX ORDER — IPRATROPIUM BROMIDE AND ALBUTEROL SULFATE 2.5; .5 MG/3ML; MG/3ML
3 SOLUTION RESPIRATORY (INHALATION)
Status: DISCONTINUED | OUTPATIENT
Start: 2024-06-03 | End: 2024-06-03

## 2024-06-03 RX ORDER — ACETAMINOPHEN 650 MG/1
650 SUPPOSITORY RECTAL EVERY 4 HOURS PRN
Status: DISCONTINUED | OUTPATIENT
Start: 2024-06-03 | End: 2024-06-05 | Stop reason: HOSPADM

## 2024-06-03 RX ORDER — ONDANSETRON 4 MG/1
4 TABLET, ORALLY DISINTEGRATING ORAL EVERY 8 HOURS PRN
Status: DISCONTINUED | OUTPATIENT
Start: 2024-06-03 | End: 2024-06-05 | Stop reason: HOSPADM

## 2024-06-03 RX ORDER — ONDANSETRON HYDROCHLORIDE 2 MG/ML
4 INJECTION, SOLUTION INTRAVENOUS EVERY 8 HOURS PRN
Status: DISCONTINUED | OUTPATIENT
Start: 2024-06-03 | End: 2024-06-05 | Stop reason: HOSPADM

## 2024-06-03 RX ORDER — PANTOPRAZOLE SODIUM 40 MG/10ML
40 INJECTION, POWDER, LYOPHILIZED, FOR SOLUTION INTRAVENOUS
Status: DISCONTINUED | OUTPATIENT
Start: 2024-06-04 | End: 2024-06-05 | Stop reason: HOSPADM

## 2024-06-03 RX ORDER — PANTOPRAZOLE SODIUM 40 MG/1
40 TABLET, DELAYED RELEASE ORAL
Status: DISCONTINUED | OUTPATIENT
Start: 2024-06-04 | End: 2024-06-05 | Stop reason: HOSPADM

## 2024-06-03 RX ORDER — SUCRALFATE 1 G/10ML
1 SUSPENSION ORAL ONCE
Status: DISCONTINUED | OUTPATIENT
Start: 2024-06-03 | End: 2024-06-03

## 2024-06-03 RX ORDER — CARVEDILOL 25 MG/1
25 TABLET ORAL 2 TIMES DAILY
Status: DISCONTINUED | OUTPATIENT
Start: 2024-06-03 | End: 2024-06-05 | Stop reason: HOSPADM

## 2024-06-03 RX ORDER — AMLODIPINE BESYLATE 10 MG/1
10 TABLET ORAL DAILY
Status: DISCONTINUED | OUTPATIENT
Start: 2024-06-03 | End: 2024-06-03

## 2024-06-03 RX ORDER — FERROUS SULFATE 325(65) MG
1 TABLET ORAL 2 TIMES DAILY
Status: DISCONTINUED | OUTPATIENT
Start: 2024-06-03 | End: 2024-06-05 | Stop reason: HOSPADM

## 2024-06-03 RX ORDER — ACETAMINOPHEN 325 MG/1
650 TABLET ORAL EVERY 4 HOURS PRN
Status: DISCONTINUED | OUTPATIENT
Start: 2024-06-03 | End: 2024-06-05 | Stop reason: HOSPADM

## 2024-06-03 RX ORDER — ALBUTEROL SULFATE 0.83 MG/ML
2.5 SOLUTION RESPIRATORY (INHALATION) EVERY 4 HOURS PRN
Status: DISCONTINUED | OUTPATIENT
Start: 2024-06-03 | End: 2024-06-03

## 2024-06-03 RX ADMIN — FERROUS SULFATE TAB 325 MG (65 MG ELEMENTAL FE) 1 TABLET: 325 (65 FE) TAB at 20:11

## 2024-06-03 RX ADMIN — ALBUTEROL SULFATE 2.5 MG: 2.5 SOLUTION RESPIRATORY (INHALATION) at 19:10

## 2024-06-03 RX ADMIN — SODIUM CHLORIDE 250 ML: 9 INJECTION, SOLUTION INTRAVENOUS at 17:47

## 2024-06-03 RX ADMIN — IPRATROPIUM BROMIDE AND ALBUTEROL SULFATE 3 ML: 2.5; .5 SOLUTION RESPIRATORY (INHALATION) at 19:17

## 2024-06-03 RX ADMIN — CARVEDILOL 25 MG: 25 TABLET, FILM COATED ORAL at 20:11

## 2024-06-03 RX ADMIN — ACETAMINOPHEN 650 MG: 325 TABLET ORAL at 20:11

## 2024-06-03 ASSESSMENT — PAIN SCALES - GENERAL
PAINLEVEL_OUTOF10: 8
PAINLEVEL_OUTOF10: 2
PAINLEVEL_OUTOF10: 8

## 2024-06-03 ASSESSMENT — PAIN DESCRIPTION - PAIN TYPE: TYPE: ACUTE PAIN

## 2024-06-03 ASSESSMENT — COLUMBIA-SUICIDE SEVERITY RATING SCALE - C-SSRS
2. HAVE YOU ACTUALLY HAD ANY THOUGHTS OF KILLING YOURSELF?: NO
1. IN THE PAST MONTH, HAVE YOU WISHED YOU WERE DEAD OR WISHED YOU COULD GO TO SLEEP AND NOT WAKE UP?: NO
6. HAVE YOU EVER DONE ANYTHING, STARTED TO DO ANYTHING, OR PREPARED TO DO ANYTHING TO END YOUR LIFE?: NO

## 2024-06-03 ASSESSMENT — PAIN DESCRIPTION - LOCATION: LOCATION: CHEST

## 2024-06-03 ASSESSMENT — PAIN - FUNCTIONAL ASSESSMENT: PAIN_FUNCTIONAL_ASSESSMENT: 0-10

## 2024-06-03 NOTE — ED TRIAGE NOTES
TRIAGE NOTE   I saw the patient as the Clinician in Triage and performed a brief history and physical exam, established acuity, and ordered appropriate tests to develop basic plan of care. Patient will be seen by an KRAIG, resident and/or physician who will independently evaluate the patient. Please see subsequent provider notes for further details and disposition.     Brief HPI: In brief, Gretel Archibald is a 38 y.o. female that presents for 1 week.  It is rated 9 or 10 and constant.  It is nonradiating.  She endorses edema to her lower extremity send at 1 point she required Lasix.  She denies history of heart failure.  She denies history of a myocardial infarction.  She has had multiple EKGs before and all of them up to 2012 have been tachycardic.  She has no prior history of a pulmonary embolism.  She has experienced a cough.  She denies fever or chills.  She denies history of diabetes.  Her only medication is antihypertensive medication and she took it at 10 AM this morning.  She denies any recent falls or trauma..     Focused Physical exam:   Sinus tachycardic regular marching.  Clear bilateral lung sounds.  Slight edema to the lower extremities.  Skin is normal in temperature and color.  Patient is alert and oriented x 3 and there is no neurologic deficit.  EKG is sinus tachycardia at 104 bpm normal axis normal [place] interval normal QT corrected no ST elevation or depression.  No right strain noted.  Interpreted by attending and myself.  Plan/MDM:   Chest pain workup ordered.  Including a chest x-ray, CBC CMP and troponin.  Patient received 324 chewable aspirin.  I wrote for nitro and 250 cc normal saline.  Please see subsequent provider note for further details and disposition

## 2024-06-03 NOTE — ED PROVIDER NOTES
HPI   Chief Complaint   Patient presents with    Chest Pain    Shortness of Breath       This is a 38-year-old female who presents to the emergency department complaining of chest pain and shortness of breath.  The symptoms have been progressive over the past week.  The patient is concerned that her blood counts can be low again.  Therefore, she started taking iron again 3 days ago.  Patient has a history of anemia due to heavy vaginal bleeding from uterine fibroids.  She had a myomectomy in the past and had some improvement in her bleeding but it has since worsened again.  The patient complains of chest pain and shortness of breath worse when she exerts herself.  She denies cough.  Denies fever.    Past medical history: Hypertension, CKD, anemia, uterine fibroids                          [place] Coma Scale Score: 15                     Patient History   Past Medical History:   Diagnosis Date    Hypertension     Personal history of other endocrine, nutritional and metabolic disease 11/21/2016    History of obesity    Prediabetes 11/21/2016    Prediabetes    Unspecified disorder of nose and nasal sinuses 02/03/2016    Sinus trouble     Past Surgical History:   Procedure Laterality Date    DILATION AND CURETTAGE OF UTERUS  11/21/2016    Dilation And Curettage    HIP SURGERY  11/21/2016    Hip Surgery    OTHER SURGICAL HISTORY  02/07/2016    History Of Prior Surgery     Family History   Problem Relation Name Age of Onset    Hypertension Mother      Bone cancer Father      COPD Father      Asthma Brother      Breast cancer Maternal Grandmother       Social History     Tobacco Use    Smoking status: Every Day     Types: Cigarettes    Smokeless tobacco: Never   Substance Use Topics    Alcohol use: Not on file    Drug use: Not on file       Physical Exam   ED Triage Vitals [06/03/24 1426]   Temperature Heart Rate Respirations BP   36.6 °C (97.9 °F) (!) 102 18 (!) 175/97      Pulse Ox Temp Source Heart Rate Source Patient  Position   94 % Temporal Monitor Sitting      BP Location FiO2 (%)     Left arm --       Physical Exam  Vitals and nursing note reviewed.   HENT:      Head: Normocephalic and atraumatic.      Nose: Nose normal.   Eyes:      Conjunctiva/sclera: Conjunctivae normal.   Cardiovascular:      Rate and Rhythm: Normal rate and regular rhythm.      Pulses: Normal pulses.      Heart sounds: Normal heart sounds.   Pulmonary:      Effort: Pulmonary effort is normal.      Breath sounds: Normal breath sounds.   Abdominal:      General: Bowel sounds are normal.      Palpations: Abdomen is soft.   Musculoskeletal:         General: Normal range of motion.      Cervical back: Normal range of motion and neck supple.   Skin:     Findings: No rash.   Neurological:      General: No focal deficit present.      Mental Status: She is alert and oriented to person, place, and time.   Psychiatric:         Mood and Affect: Mood normal.         ED Course & MDM   Diagnoses as of 06/03/24 1805   Anemia, unspecified type   Vaginal bleeding       Medical Decision Making  Differential diagnosis: I have considered the following conditions in my assessment of this patient's condition:  COPD, asthma, CHF, DKA, ACS, pneumonia, pneumothorax, pulmonary embolus, sepsis, bronchitis, foreign body aspiration, anemia.    This is a 38-year-old female who presents to the emergency department with chest pain and shortness of breath.  She was evaluated with EKG, chest x-ray and labs.  Her labs showed a low hemoglobin of 4.2.  Troponin was mildly elevated at 21.  It has been elevated at 42 in the past.  Suspect this is due to demand.  Creatinine also elevated consistent with her known CKD.  The patient required blood transfusion due to her symptomatic anemia.  2 units of blood were ordered.  Patient agreed and consent was signed.  She requires admission for further evaluation and management.    Amount and/or Complexity of Data Reviewed  ECG/medicine tests:  independent interpretation performed.     Details: Sinus tachycardia, heart rate 106, no ST elevations.        Procedure  Critical Care    Performed by: Babak Bernabe MD  Authorized by: Babak Bernabe MD    Critical care provider statement:     Critical care time (minutes):  35    Critical care time was exclusive of:  Separately billable procedures and treating other patients    Critical care was necessary to treat or prevent imminent or life-threatening deterioration of the following conditions:  Circulatory failure    Critical care was time spent personally by me on the following activities:  Development of treatment plan with patient or surrogate, examination of patient, ordering and performing treatments and interventions, ordering and review of laboratory studies, review of old charts and ordering and review of radiographic studies    Care discussed with: admitting provider         Babak Bernabe MD  06/03/24 1525

## 2024-06-03 NOTE — H&P
History Of Present Illness  Gretel Archibald is a 38 y.o. female presenting with anemia, chest pain, shortness of breath, elevated troponin, very mild acute on chronic CKD.  Patient was found to have a hemoglobin here of 4.2.  Patient has a history of abnormal vaginal bleeding and iron deficiency anemia as well as asthmatic bronchitis and hypertension.  Patient stopped taking her iron 5 or 6 months ago and just restarted about 3 days ago.  Patient states she has had chest pain and shortness of breath for a week.  Chest pain is midsternal and intermittent.  It occurs 4-5 times a day only with exertion and lasts about 2 minutes at a time.  It is sharp and does not occur at rest.  The shortness of breath has been there for about a week even when she is at rest.  It is worse with exertion and worse when she is supine.  She has been sleeping on 3 pillows but she normally sleeps on 2 pillows.  Her feet up and slightly swollen for about 5 days.  She has had a dry cough for a week but did not use her albuterol.  She denies sore throat or rhinorrhea nasal congestion etc.    Past medical history: Iron deficiency anemia, abnormal vaginal bleeding, asthmatic bronchitis, hypertension, thyroid disorder    Medications: Losartan/hydrochlorothiazide, spironolactone, carvedilol, amlodipine, iron which she just restarted 3 days ago, albuterol as needed    Allergies: Morphine and oxycodone    Social history: Patient smokes a half a pack a cigarettes a day, denies alcohol use    ED course: Patient's metabolic panel was within normal limits other than a chloride of 110 and a creatinine of 1.44 with a GFR 48.  Her creatinine in August 2023 was 1.16.  Patient's magnesium level and total protein were within normal limits  BNP was elevated slightly at 268, troponin was 21 and the second troponin was 22, her troponin in 2022 was 42.  CBC revealed a white count of 12.4 with a slight left shift, although patient appears to have a chronically  elevated white blood cell count on review of previous studies.  Hemoglobin was 4.2 and hematocrit was 18.6, compared to 9.2 and 31.6 in August 2023.  Platelets were elevated at 496, but previous studies have shown thrombocytosis as well.  Chest x-ray showed cardiomegaly but no other acute process  EKG showed sinus tachycardia with a heart rate of 106  The ED physician ordered type and cross and transfusion of 2 units of packed red blood cells.  Patient also had a 250 cc saline bolus.       Past Medical History:   Diagnosis Date    Hypertension     Personal history of other endocrine, nutritional and metabolic disease 11/21/2016    History of obesity    Prediabetes 11/21/2016    Prediabetes    Unspecified disorder of nose and nasal sinuses 02/03/2016    Sinus trouble     Past Surgical History:   Procedure Laterality Date    DILATION AND CURETTAGE OF UTERUS  11/21/2016    Dilation And Curettage    HIP SURGERY  11/21/2016    Hip Surgery    OTHER SURGICAL HISTORY  02/07/2016    History Of Prior Surgery     Social History     Tobacco Use    Smoking status: Every Day     Types: Cigarettes    Smokeless tobacco: Never        Family History  Family History   Problem Relation Name Age of Onset    Hypertension Mother      Bone cancer Father      COPD Father      Asthma Brother      Breast cancer Maternal Grandmother          Allergies  Morphine and Oxycodone-acetaminophen    Review of Systems  Patient denies  nausea, vomiting, fever, chills, diarrhea, constipation,  vision changes, rashes, dysuria, paresthesias, vertigo, headache,  cold symptoms, or any other complaints at this time. A complete review of systems was done, and is as stated in the history of present illness, is otherwise negative or not pertinent to the complaint.    Physical Exam  Physical exam: Vital signs and nurses notes were reviewed.    General:  no acute distress. Alert and oriented  x 3.  Frequent dry cough but talks in full sentences.    Head:  atraumatic and normocephalic    Eyes: Pupils equal round reactive to light, EOMs are intact, conjunctivae is not injected.    Oropharynx: No erythema or exudate noted, no trismus or drooling, buccal mucosa is moist.    Ears:  normal external exam, no swelling or erythema,     Nasal: normal external exam,     Neck: Supple, full range of motion,     Cardiac: Regular rate and rhythm. No murmurs noted.     Pulmonary: Lung sounds slightly diminished bilaterally.. No adventitious breath sounds. No wheezes rales or rhonchi. No accessory muscle use no retraction noted.    Abdomen: Soft,  Nontender. No guarding, rigidity, or distention. Normoactive bowel sounds. No pulsatile masses, no bruits.     Extremities:  Full range of motion.  Trace pitting edema in the distal lower legs and feet.    Skin: No rash seen. Skin is warm and dry     Neuro: Patient is alert and oriented x3. Speech is clear. There is no asymmetry with facial grimaces, and no tongue deviation. Patient moves all extremities independently. Sensation is intact. No obvious neuro deficits are noted.     Last Recorded Vitals  /89   Pulse 93   Temp 36.6 °C (97.9 °F) (Temporal)   Resp 16   Wt 99.8 kg (220 lb)   SpO2 96%     Relevant Results  Scheduled medications  amLODIPine, 10 mg, oral, Daily  [START ON 6/4/2024] carvedilol, 25 mg, oral, BID  enoxaparin, 40 mg, subcutaneous, q12h GARIMA  ferrous sulfate (325 mg ferrous sulfate), 1 tablet, oral, BID  ipratropium-albuteroL, 3 mL, nebulization, q6h  losartan 100 mg, hydroCHLOROthiazide 25 mg for Hyzaar 100/25, , oral, Daily  [START ON 6/4/2024] pantoprazole, 40 mg, oral, Daily before breakfast   Or  [START ON 6/4/2024] pantoprazole, 40 mg, intravenous, Daily before breakfast  spironolactone, 50 mg, oral, Daily      Continuous medications     PRN medications  PRN medications: acetaminophen **OR** acetaminophen **OR** acetaminophen, albuterol, melatonin, ondansetron ODT **OR** ondansetron, oxygen    Results for  orders placed or performed during the hospital encounter of 06/03/24 (from the past 24 hour(s))   CBC and Auto Differential   Result Value Ref Range    WBC 12.4 (H) 4.4 - 11.3 x10*3/uL    nRBC 1.9 (H) 0.0 - 0.0 /100 WBCs    RBC 2.79 (L) 4.00 - 5.20 x10*6/uL    Hemoglobin 4.2 (LL) 12.0 - 16.0 g/dL    Hematocrit 18.6 (L) 36.0 - 46.0 %    MCV 67 (L) 80 - 100 fL    MCH 15.1 (L) 26.0 - 34.0 pg    MCHC 22.6 (L) 32.0 - 36.0 g/dL    RDW 27.6 (H) 11.5 - 14.5 %    Platelets 496 (H) 150 - 450 x10*3/uL    Neutrophils % 67.6 40.0 - 80.0 %    Immature Granulocytes %, Automated 4.3 (H) 0.0 - 0.9 %    Lymphocytes % 16.9 13.0 - 44.0 %    Monocytes % 6.7 2.0 - 10.0 %    Eosinophils % 4.1 0.0 - 6.0 %    Basophils % 0.4 0.0 - 2.0 %    Neutrophils Absolute 8.34 (H) 1.20 - 7.70 x10*3/uL    Immature Granulocytes Absolute, Automated 0.53 0.00 - 0.70 x10*3/uL    Lymphocytes Absolute 2.09 1.20 - 4.80 x10*3/uL    Monocytes Absolute 0.83 0.10 - 1.00 x10*3/uL    Eosinophils Absolute 0.51 0.00 - 0.70 x10*3/uL    Basophils Absolute 0.05 0.00 - 0.10 x10*3/uL   Comprehensive Metabolic Panel   Result Value Ref Range    Glucose 99 74 - 99 mg/dL    Sodium 142 136 - 145 mmol/L    Potassium 3.9 3.5 - 5.3 mmol/L    Chloride 110 (H) 98 - 107 mmol/L    Bicarbonate 24 21 - 32 mmol/L    Anion Gap 12 10 - 20 mmol/L    Urea Nitrogen 12 6 - 23 mg/dL    Creatinine 1.44 (H) 0.50 - 1.05 mg/dL    eGFR 48 (L) >60 mL/min/1.73m*2    Calcium 8.6 8.6 - 10.3 mg/dL    Albumin 3.8 3.4 - 5.0 g/dL    Alkaline Phosphatase 72 33 - 110 U/L    Total Protein 7.0 6.4 - 8.2 g/dL    AST 25 9 - 39 U/L    Bilirubin, Total 0.5 0.0 - 1.2 mg/dL    ALT 30 7 - 45 U/L   Magnesium   Result Value Ref Range    Magnesium 2.40 1.60 - 2.40 mg/dL   Troponin I, High Sensitivity, Initial   Result Value Ref Range    Troponin I, High Sensitivity 21 (H) 0 - 13 ng/L   B-type natriuretic peptide   Result Value Ref Range     (H) 0 - 99 pg/mL   Morphology   Result Value Ref Range    RBC Morphology  See Below     Polychromasia Marked     Hypochromia Marked     RBC Fragments Few     Ovalocytes Few     Teardrop Cells Few    Prepare RBC: 2 Units   Result Value Ref Range    PRODUCT CODE R4581A33     Unit Number [id number]     Unit ABO B     Unit RH POS     XM INTEP COMP     Dispense Status XM     Blood Expiration Date June 11, 2024 23:59 EDT     PRODUCT BLOOD TYPE 7300     UNIT VOLUME 350     PRODUCT CODE G6856V58     Unit Number [id number]     Unit ABO B     Unit RH POS     XM INTEP COMP     Dispense Status XM     Blood Expiration Date June 19, 2024 23:59 EDT     PRODUCT BLOOD TYPE 7300     UNIT VOLUME 350    Troponin, High Sensitivity, 1 Hour   Result Value Ref Range    Troponin I, High Sensitivity 22 (H) 0 - 13 ng/L   Type And Screen   Result Value Ref Range    ABO TYPE B     Rh TYPE POS     ANTIBODY SCREEN NEG      XR chest 1 view   Final Result   1.  Cardiomegaly.   Signed by Oli Stiles MD             Assessment/Plan   Principal Problem:    Anemia, unspecified type  Active Problems:    Shortness of breath    Chest pain    Asthmatic bronchitis with exacerbation ([facility])    Elevated troponin    Acute on chronic kidney failure (CMS-HCC)      Plan: Transfusion of 2 units of packed red blood cells pending  Repeat CBC posttransfusion  Daily CBC and BMP  DuoNebs every 6 hours and albuterol as needed for her asthmatic bronchitis and dry cough  Tessalon Perles for cough  Third troponin pending  Telemetry  Oxygen as needed  Lovenox not ordered due to HGB of 4.2.  SCDs ordered.  Pantoprazole ordered  Patient's home medications including losartan, HCTZ, spironolactone, carvedilol, amlodipine, and iron are ordered.  Findings, orders, plan discussed with Dr. Newman    Addendum: This patient was discussed with Dr. Leger who is the on-call for GYN at .  He will see the patient tomorrow and consult order for GYN is placed.       Candy Gill PA-C

## 2024-06-04 ENCOUNTER — APPOINTMENT (OUTPATIENT)
Dept: RADIOLOGY | Facility: HOSPITAL | Age: 39
End: 2024-06-04

## 2024-06-04 LAB
ANION GAP SERPL CALC-SCNC: 12 MMOL/L (ref 10–20)
BLOOD EXPIRATION DATE: NORMAL
BLOOD EXPIRATION DATE: NORMAL
BNP SERPL-MCNC: 270 PG/ML (ref 0–99)
BUN SERPL-MCNC: 13 MG/DL (ref 6–23)
CALCIUM SERPL-MCNC: 8.6 MG/DL (ref 8.6–10.3)
CHLORIDE SERPL-SCNC: 108 MMOL/L (ref 98–107)
CHLORIDE UR-SCNC: 138 MMOL/L
CHLORIDE/CREATININE (MMOL/G) IN URINE: 1408 MMOL/G CREAT (ref 38–318)
CO2 SERPL-SCNC: 23 MMOL/L (ref 21–32)
CREAT SERPL-MCNC: 1.45 MG/DL (ref 0.5–1.05)
CREAT UR-MCNC: 9.8 MG/DL (ref 20–320)
DISPENSE STATUS: NORMAL
DISPENSE STATUS: NORMAL
EGFRCR SERPLBLD CKD-EPI 2021: 47 ML/MIN/1.73M*2
ERYTHROCYTE [DISTWIDTH] IN BLOOD BY AUTOMATED COUNT: 31.3 % (ref 11.5–14.5)
GLUCOSE SERPL-MCNC: 103 MG/DL (ref 74–99)
HCT VFR BLD AUTO: 25.3 % (ref 36–46)
HGB BLD-MCNC: 6.7 G/DL (ref 12–16)
MCH RBC QN AUTO: 19.1 PG (ref 26–34)
MCHC RBC AUTO-ENTMCNC: 26.5 G/DL (ref 32–36)
MCV RBC AUTO: 72 FL (ref 80–100)
NRBC BLD-RTO: 1.5 /100 WBCS (ref 0–0)
PLATELET # BLD AUTO: 499 X10*3/UL (ref 150–450)
POTASSIUM SERPL-SCNC: 3.7 MMOL/L (ref 3.5–5.3)
POTASSIUM UR-SCNC: 10 MMOL/L
POTASSIUM/CREAT UR-RTO: 102 MMOL/G CREAT
PRODUCT BLOOD TYPE: 7300
PRODUCT BLOOD TYPE: 7300
PRODUCT CODE: NORMAL
PRODUCT CODE: NORMAL
RBC # BLD AUTO: 3.5 X10*6/UL (ref 4–5.2)
SODIUM SERPL-SCNC: 139 MMOL/L (ref 136–145)
SODIUM UR-SCNC: 125 MMOL/L
SODIUM/CREAT UR-RTO: 1276 MMOL/G CREAT
UNIT ABO: NORMAL
UNIT ABO: NORMAL
UNIT NUMBER: NORMAL
UNIT NUMBER: NORMAL
UNIT RH: NORMAL
UNIT RH: NORMAL
UNIT VOLUME: 350
UNIT VOLUME: 350
WBC # BLD AUTO: 13.3 X10*3/UL (ref 4.4–11.3)
XM INTEP: NORMAL
XM INTEP: NORMAL

## 2024-06-04 PROCEDURE — 2500000001 HC RX 250 WO HCPCS SELF ADMINISTERED DRUGS (ALT 637 FOR MEDICARE OP): Performed by: INTERNAL MEDICINE

## 2024-06-04 PROCEDURE — 99233 SBSQ HOSP IP/OBS HIGH 50: CPT | Performed by: INTERNAL MEDICINE

## 2024-06-04 PROCEDURE — 2500000002 HC RX 250 W HCPCS SELF ADMINISTERED DRUGS (ALT 637 FOR MEDICARE OP, ALT 636 FOR OP/ED): Performed by: EMERGENCY MEDICINE

## 2024-06-04 PROCEDURE — 94640 AIRWAY INHALATION TREATMENT: CPT

## 2024-06-04 PROCEDURE — 99222 1ST HOSP IP/OBS MODERATE 55: CPT | Performed by: INTERNAL MEDICINE

## 2024-06-04 PROCEDURE — 2500000002 HC RX 250 W HCPCS SELF ADMINISTERED DRUGS (ALT 637 FOR MEDICARE OP, ALT 636 FOR OP/ED): Performed by: PHYSICIAN ASSISTANT

## 2024-06-04 PROCEDURE — 2060000001 HC INTERMEDIATE ICU ROOM DAILY

## 2024-06-04 PROCEDURE — 76830 TRANSVAGINAL US NON-OB: CPT

## 2024-06-04 PROCEDURE — 2500000004 HC RX 250 GENERAL PHARMACY W/ HCPCS (ALT 636 FOR OP/ED): Performed by: INTERNAL MEDICINE

## 2024-06-04 PROCEDURE — 2500000005 HC RX 250 GENERAL PHARMACY W/O HCPCS: Performed by: PHYSICIAN ASSISTANT

## 2024-06-04 PROCEDURE — 99221 1ST HOSP IP/OBS SF/LOW 40: CPT | Performed by: OBSTETRICS & GYNECOLOGY

## 2024-06-04 PROCEDURE — 83880 ASSAY OF NATRIURETIC PEPTIDE: CPT | Performed by: INTERNAL MEDICINE

## 2024-06-04 PROCEDURE — 82436 ASSAY OF URINE CHLORIDE: CPT | Performed by: INTERNAL MEDICINE

## 2024-06-04 PROCEDURE — 85027 COMPLETE CBC AUTOMATED: CPT | Performed by: PHYSICIAN ASSISTANT

## 2024-06-04 PROCEDURE — 76856 US EXAM PELVIC COMPLETE: CPT | Performed by: RADIOLOGY

## 2024-06-04 PROCEDURE — 82374 ASSAY BLOOD CARBON DIOXIDE: CPT | Performed by: PHYSICIAN ASSISTANT

## 2024-06-04 PROCEDURE — 84300 ASSAY OF URINE SODIUM: CPT | Performed by: INTERNAL MEDICINE

## 2024-06-04 PROCEDURE — 36430 TRANSFUSION BLD/BLD COMPNT: CPT

## 2024-06-04 PROCEDURE — 36415 COLL VENOUS BLD VENIPUNCTURE: CPT | Performed by: PHYSICIAN ASSISTANT

## 2024-06-04 PROCEDURE — P9016 RBC LEUKOCYTES REDUCED: HCPCS

## 2024-06-04 PROCEDURE — 80048 BASIC METABOLIC PNL TOTAL CA: CPT | Performed by: PHYSICIAN ASSISTANT

## 2024-06-04 PROCEDURE — 76856 US EXAM PELVIC COMPLETE: CPT

## 2024-06-04 PROCEDURE — 2500000004 HC RX 250 GENERAL PHARMACY W/ HCPCS (ALT 636 FOR OP/ED): Performed by: NURSE PRACTITIONER

## 2024-06-04 PROCEDURE — 76830 TRANSVAGINAL US NON-OB: CPT | Performed by: RADIOLOGY

## 2024-06-04 PROCEDURE — 2500000001 HC RX 250 WO HCPCS SELF ADMINISTERED DRUGS (ALT 637 FOR MEDICARE OP): Performed by: PHYSICIAN ASSISTANT

## 2024-06-04 RX ORDER — LABETALOL 100 MG/1
100 TABLET, FILM COATED ORAL EVERY 6 HOURS PRN
Status: DISCONTINUED | OUTPATIENT
Start: 2024-06-04 | End: 2024-06-04

## 2024-06-04 RX ORDER — HYDRALAZINE HYDROCHLORIDE 10 MG/1
10 TABLET, FILM COATED ORAL EVERY 6 HOURS PRN
Status: DISCONTINUED | OUTPATIENT
Start: 2024-06-04 | End: 2024-06-05 | Stop reason: HOSPADM

## 2024-06-04 RX ORDER — FUROSEMIDE 10 MG/ML
40 INJECTION INTRAMUSCULAR; INTRAVENOUS ONCE
Status: COMPLETED | OUTPATIENT
Start: 2024-06-04 | End: 2024-06-04

## 2024-06-04 RX ADMIN — IPRATROPIUM BROMIDE AND ALBUTEROL SULFATE 3 ML: 2.5; .5 SOLUTION RESPIRATORY (INHALATION) at 08:27

## 2024-06-04 RX ADMIN — FUROSEMIDE 40 MG: 10 INJECTION, SOLUTION INTRAMUSCULAR; INTRAVENOUS at 15:07

## 2024-06-04 RX ADMIN — FERROUS SULFATE TAB 325 MG (65 MG ELEMENTAL FE) 1 TABLET: 325 (65 FE) TAB at 20:21

## 2024-06-04 RX ADMIN — FERROUS SULFATE TAB 325 MG (65 MG ELEMENTAL FE) 1 TABLET: 325 (65 FE) TAB at 08:57

## 2024-06-04 RX ADMIN — Medication 3 MG: at 01:26

## 2024-06-04 RX ADMIN — IPRATROPIUM BROMIDE AND ALBUTEROL SULFATE 3 ML: 2.5; .5 SOLUTION RESPIRATORY (INHALATION) at 21:38

## 2024-06-04 RX ADMIN — AMLODIPINE BESYLATE 10 MG: 10 TABLET ORAL at 08:57

## 2024-06-04 RX ADMIN — CARVEDILOL 25 MG: 25 TABLET, FILM COATED ORAL at 08:57

## 2024-06-04 RX ADMIN — SPIRONOLACTONE 50 MG: 25 TABLET ORAL at 11:34

## 2024-06-04 RX ADMIN — CARVEDILOL 25 MG: 25 TABLET, FILM COATED ORAL at 20:24

## 2024-06-04 RX ADMIN — ACETAMINOPHEN 650 MG: 325 TABLET ORAL at 17:22

## 2024-06-04 RX ADMIN — Medication 3 MG: at 20:21

## 2024-06-04 RX ADMIN — SODIUM CHLORIDE, POTASSIUM CHLORIDE, SODIUM LACTATE AND CALCIUM CHLORIDE 500 ML: 600; 310; 30; 20 INJECTION, SOLUTION INTRAVENOUS at 17:02

## 2024-06-04 RX ADMIN — PANTOPRAZOLE SODIUM 40 MG: 40 TABLET, DELAYED RELEASE ORAL at 07:06

## 2024-06-04 RX ADMIN — HYDRALAZINE HYDROCHLORIDE 10 MG: 10 TABLET, FILM COATED ORAL at 17:09

## 2024-06-04 RX ADMIN — Medication 2 L/MIN: at 21:38

## 2024-06-04 RX ADMIN — HYDROCHLOROTHIAZIDE: 25 TABLET ORAL at 08:57

## 2024-06-04 SDOH — SOCIAL STABILITY: SOCIAL INSECURITY: HAS ANYONE EVER THREATENED TO HURT YOUR FAMILY OR YOUR PETS?: NO

## 2024-06-04 SDOH — SOCIAL STABILITY: SOCIAL INSECURITY: HAVE YOU HAD THOUGHTS OF HARMING ANYONE ELSE?: NO

## 2024-06-04 SDOH — SOCIAL STABILITY: SOCIAL INSECURITY: HAVE YOU HAD ANY THOUGHTS OF HARMING ANYONE ELSE?: NO

## 2024-06-04 SDOH — SOCIAL STABILITY: SOCIAL INSECURITY: DO YOU FEEL ANYONE HAS EXPLOITED OR TAKEN ADVANTAGE OF YOU FINANCIALLY OR OF YOUR PERSONAL PROPERTY?: NO

## 2024-06-04 SDOH — SOCIAL STABILITY: SOCIAL INSECURITY: ARE YOU OR HAVE YOU BEEN THREATENED OR ABUSED PHYSICALLY, EMOTIONALLY, OR SEXUALLY BY ANYONE?: NO

## 2024-06-04 SDOH — SOCIAL STABILITY: SOCIAL INSECURITY: ABUSE: ADULT

## 2024-06-04 SDOH — SOCIAL STABILITY: SOCIAL INSECURITY: DO YOU FEEL UNSAFE GOING BACK TO THE PLACE WHERE YOU ARE LIVING?: NO

## 2024-06-04 SDOH — SOCIAL STABILITY: SOCIAL INSECURITY: ARE THERE ANY APPARENT SIGNS OF INJURIES/BEHAVIORS THAT COULD BE RELATED TO ABUSE/NEGLECT?: NO

## 2024-06-04 SDOH — SOCIAL STABILITY: SOCIAL INSECURITY: DOES ANYONE TRY TO KEEP YOU FROM HAVING/CONTACTING OTHER FRIENDS OR DOING THINGS OUTSIDE YOUR HOME?: NO

## 2024-06-04 ASSESSMENT — COGNITIVE AND FUNCTIONAL STATUS - GENERAL
MOBILITY SCORE: 24
DAILY ACTIVITIY SCORE: 24
DAILY ACTIVITIY SCORE: 24
PATIENT BASELINE BEDBOUND: NO
MOBILITY SCORE: 24

## 2024-06-04 ASSESSMENT — ACTIVITIES OF DAILY LIVING (ADL)
HEARING - RIGHT EAR: FUNCTIONAL
DRESSING YOURSELF: INDEPENDENT
GROOMING: INDEPENDENT
FEEDING YOURSELF: INDEPENDENT
WALKS IN HOME: INDEPENDENT
PATIENT'S MEMORY ADEQUATE TO SAFELY COMPLETE DAILY ACTIVITIES?: YES
LACK_OF_TRANSPORTATION: NO
BATHING: INDEPENDENT
ADEQUATE_TO_COMPLETE_ADL: YES
HEARING - LEFT EAR: FUNCTIONAL
JUDGMENT_ADEQUATE_SAFELY_COMPLETE_DAILY_ACTIVITIES: YES
TOILETING: INDEPENDENT

## 2024-06-04 ASSESSMENT — PAIN SCALES - GENERAL
PAINLEVEL_OUTOF10: 0 - NO PAIN
PAINLEVEL_OUTOF10: 0 - NO PAIN
PAINLEVEL_OUTOF10: 3
PAINLEVEL_OUTOF10: 3

## 2024-06-04 ASSESSMENT — LIFESTYLE VARIABLES
HOW MANY STANDARD DRINKS CONTAINING ALCOHOL DO YOU HAVE ON A TYPICAL DAY: PATIENT DOES NOT DRINK
SKIP TO QUESTIONS 9-10: 1
AUDIT-C TOTAL SCORE: 0
HOW OFTEN DO YOU HAVE A DRINK CONTAINING ALCOHOL: NEVER
HOW OFTEN DO YOU HAVE 6 OR MORE DRINKS ON ONE OCCASION: NEVER
AUDIT-C TOTAL SCORE: 0

## 2024-06-04 ASSESSMENT — PATIENT HEALTH QUESTIONNAIRE - PHQ9
SUM OF ALL RESPONSES TO PHQ9 QUESTIONS 1 & 2: 0
1. LITTLE INTEREST OR PLEASURE IN DOING THINGS: NOT AT ALL
2. FEELING DOWN, DEPRESSED OR HOPELESS: NOT AT ALL

## 2024-06-04 ASSESSMENT — PAIN DESCRIPTION - LOCATION: LOCATION: HEAD

## 2024-06-04 ASSESSMENT — PAIN - FUNCTIONAL ASSESSMENT
PAIN_FUNCTIONAL_ASSESSMENT: 0-10
PAIN_FUNCTIONAL_ASSESSMENT: 0-10

## 2024-06-04 NOTE — PROGRESS NOTES
Transitional Care Coordination Progress Note:  Plan per Medical/Surgical team: treatment of anemia, CP & SOB with Duoneb, IV fluids, oxygen @ 2 liters, OB/GYN consult    Status: Inpatient   Payor source: Self pay?  Discharge disposition: Home with  & children   Potential Barriers: H/H 6.7/25.3- post 2 units PRBC  ADOD: 6/6/2024   DENNIS Razo RN, BSN Transitional Care Coordinator ED# [phone]      06/04/24 0643   Discharge Planning   Living Arrangements Spouse/significant other;Children   Support Systems Spouse/significant other   Assistance Needed OB/GYN work up   Type of Residence Private residence   Number of Stairs to Enter Residence 5   Number of Stairs Within Residence 14   Home or Post Acute Services None   Patient expects to be discharged to: Home with  & children   Does the patient need discharge transport arranged? No   Financial Resource Strain   How hard is it for you to pay for the very basics like food, housing, medical care, and heating? Not hard   Housing Stability   In the last 12 months, was there a time when you were not able to pay the mortgage or rent on time? N   In the last 12 months, how many places have you lived? 1   In the last 12 months, was there a time when you did not have a steady place to sleep or slept in a shelter (including now)? N   Transportation Needs   In the past 12 months, has lack of transportation kept you from medical appointments or from getting medications? no   In the past 12 months, has lack of transportation kept you from meetings, work, or from getting things needed for daily living? No

## 2024-06-04 NOTE — CARE PLAN
The patient's goals for the shift include      The clinical goals for the shift include monitor bleeding, reduce oxygen demand    Over the shift, the patient did not make progress toward the following goals. Barriers to progression include   Problem: Pain  Goal: My pain/discomfort is manageable  Outcome: Progressing     Problem: Safety  Goal: Patient will be injury free during hospitalization  Outcome: Progressing  Goal: I will remain free of falls  Outcome: Progressing     Problem: Daily Care  Goal: Daily care needs are met  Outcome: Progressing     Problem: Psychosocial Needs  Goal: Demonstrates ability to cope with hospitalization/illness  Outcome: Progressing  Goal: Collaborate with me, my family, and caregiver to identify my specific goals  Outcome: Progressing  Flowsheets (Taken 6/4/2024 3557)  Cultural Requests During Hospitalization: none  Spiritual Requests During Hospitalization: none     Problem: Discharge Barriers  Goal: My discharge needs are met  Outcome: Progressing   . Recommendations to address these barriers include.

## 2024-06-04 NOTE — PROGRESS NOTES
Gretel Archibald is a 38 y.o. female on day 1 of admission presenting with Anemia, unspecified type.      Subjective   Seen and examined, no new complaints.  No overnight events.  The plan discussed with the patient and RN.       Objective     Last Recorded Vitals  BP (!) 171/99 (BP Location: Left arm, Patient Position: Lying)   Pulse 77   Temp 36.7 °C (98 °F) (Temporal)   Resp 18   Wt 99.8 kg (220 lb)   SpO2 98%   Intake/Output last 3 Shifts:    Intake/Output Summary (Last 24 hours) at 6/4/2024 1630  Last data filed at 6/4/2024 0410  Gross per 24 hour   Intake 1050 ml   Output --   Net 1050 ml       Admission Weight  Weight: 99.8 kg (220 lb) (06/03/24 1426)    Daily Weight  06/03/24 : 99.8 kg (220 lb)    Image Results  US PELVIS TRANSABDOMINAL WITH TRANSVAGINAL  Narrative: Interpreted By:  Mario Dillon,   STUDY:  US PELVIS TRANSABDOMINAL WITH TRANSVAGINAL; 6/4/2024 11:00 am      INDICATION:  Signs/Symptoms:Pain; anemia, bleeding. History of vaginal bleeding  with clots for 4 years. History of fibroids. Status post myomectomy  2022.      COMPARISON:  05/25/2021      ACCESSION NUMBER(S):  [id number]      ORDERING CLINICIAN:  CHICHI LINDER      TECHNIQUE:  Grayscale and color Doppler imaging of the pelvis were performed.  Transabdominal technique was utilized as well as transvaginal  ultrasound to better visualize the adnexa.      FINDINGS:  COMMENTS: Technologist note states that the exam is limited due to  the patient's condition.      Uterus:  Size: 10.4 cm x 5.4 cm x 5.9 cm. There is a 5.1 cm heterogeneous  submucosal fibroid obscuring the endometrium within the body of the  uterus posteriorly. Endometrial Thickness: 2 mm. Endometrium is  predominantly obscured by a submucosal posterior fibroid of the body  of the uterus. A minimal amount of fluid is noted within the lower  uterine segment endometrial cavity.      Ovaries: There is normal color-flow with no sign of ovarian torsion.  Right ovary: 3.5  cm x 2.7 cm x 2.4 cm. Follicular cysts are present  within the right ovary. Right ovary volume: 11.9 cm3  Left ovary: Is obscured by bowel gas.          There is a small amount of free fluid within the cul-de-sac.      Impression: 1. Large submucosal posterior fibroid involving the body of the  uterus, obscuring the endometrial stripe.  2. Small amount of fluid within the lower uterine segment endometrial  cavity.  3. Normal appearance of the right ovary with simple appearing  follicular cyst noted.  4. Left ovary is obscured by bowel gas.  5. Physiologic amount of free fluid within the cul-de-sac.      MACRO:  none      Signed by: Mario Dillon 6/4/2024 11:28 AM  Dictation workstation:   [id number]  ECG 12 lead  Sinus tachycardia  Nonspecific ST and T wave abnormality  Abnormal ECG  When compared with ECG of 18-JUN-2022 12:44,  Previous ECG has undetermined rhythm, needs review  Nonspecific T wave abnormality no longer evident in Inferior leads      Physical Exam  General:  Patient is awake, alert, and oriented.  Patient is in no acute distress.  HEENT:  Pupils equal and reactive.  Normocephalic.  Moist mucosa.    Neck:  No thyromegaly.  Normal Jugular Venous Pressure.  Cardiovascular:  Regular rate and rhythm.  Normal S1 and S2.  Pulmonary:  Clear to auscultation bilaterally.  Abdomen:  Soft. Non-tender.   Non-distended.  Positive bowel sounds.  Lower Extremities:  2+ pedal pulses.  +2 lower extremity edema bilateral  Neurologic:  Cranial nerves intact.  No focal deficit.   Skin: Skin warm and dry, normal skin turgor.   Psychiatric: Normal affect.      Relevant Results      Results for orders placed or performed during the hospital encounter of 06/03/24 (from the past 24 hour(s))   Prepare RBC: 2 Units   Result Value Ref Range    PRODUCT CODE N5569X55     Unit Number [id number]     Unit ABO B     Unit RH POS     XM INTEP COMP     Dispense Status TR     Blood Expiration Date June 11, 2024 23:59 EDT      PRODUCT BLOOD TYPE 7300     UNIT VOLUME 350     PRODUCT CODE I9987H90     Unit Number [id number]     Unit ABO B     Unit RH POS     XM INTEP COMP     Dispense Status TR     Blood Expiration Date June 19, 2024 23:59 EDT     PRODUCT BLOOD TYPE 7300     UNIT VOLUME 350    Troponin, High Sensitivity, 1 Hour   Result Value Ref Range    Troponin I, High Sensitivity 22 (H) 0 - 13 ng/L   Type And Screen   Result Value Ref Range    ABO TYPE B     Rh TYPE POS     ANTIBODY SCREEN NEG    Troponin I, High Sensitivity   Result Value Ref Range    Troponin I, High Sensitivity 26 (H) 0 - 13 ng/L   hCG, quantitative, pregnancy   Result Value Ref Range    HCG, Beta-Quantitative <2 <5 mIU/mL   CBC   Result Value Ref Range    WBC 13.3 (H) 4.4 - 11.3 x10*3/uL    nRBC 1.5 (H) 0.0 - 0.0 /100 WBCs    RBC 3.50 (L) 4.00 - 5.20 x10*6/uL    Hemoglobin 6.7 (L) 12.0 - 16.0 g/dL    Hematocrit 25.3 (L) 36.0 - 46.0 %    MCV 72 (L) 80 - 100 fL    MCH 19.1 (L) 26.0 - 34.0 pg    MCHC 26.5 (L) 32.0 - 36.0 g/dL    RDW 31.3 (H) 11.5 - 14.5 %    Platelets 499 (H) 150 - 450 x10*3/uL   Basic metabolic panel   Result Value Ref Range    Glucose 103 (H) 74 - 99 mg/dL    Sodium 139 136 - 145 mmol/L    Potassium 3.7 3.5 - 5.3 mmol/L    Chloride 108 (H) 98 - 107 mmol/L    Bicarbonate 23 21 - 32 mmol/L    Anion Gap 12 10 - 20 mmol/L    Urea Nitrogen 13 6 - 23 mg/dL    Creatinine 1.45 (H) 0.50 - 1.05 mg/dL    eGFR 47 (L) >60 mL/min/1.73m*2    Calcium 8.6 8.6 - 10.3 mg/dL   B-type natriuretic peptide   Result Value Ref Range     (H) 0 - 99 pg/mL             Assessment/Plan       Principal Problem:    Anemia, unspecified type  Active Problems:    Shortness of breath    Chest pain    Asthmatic bronchitis with exacerbation ([facility]HS-HCC)    Elevated troponin    Acute on chronic kidney failure (CMS-HCC)    Gretel Archibald is a 38 y.o. female presenting with a past medical history iron deficiency anemia, asthma bronchitis, hypertension, CKD, thyroid disorder and  abnormal vaginal bleeding.  She presented to [facility] ED with chest pain, shortness of breath and anemia.     #Anemia  #Recurrent fibroid uterus with menorrhagia  -Hgb today 4.2 >> 6.7  -Vaginal US ordered  -Will transfuse 2 units of pRBCSs  -GYN consulted, recs are appreciated    #Chest pain  -Trops 21>>22>> 26  -ECHO ordered  -Cards consulted    #MACIEL  -Urine lytes ordered  -Will monitor improvement with IV fluid    #HA  -Tylenol PRN       Disposition  -Pending improvement of symptoms, get blood today, evaluation by GYN       Lucas Ochoa MD

## 2024-06-04 NOTE — CONSULTS
Obstetrical Consult    Reason for Consult: Fibroid uterus, vaginal bleeding, severe anemia.    Assessment/Plan    Recurrent fibroid uterus with menorrhagia.  No current bleeding.  No hormonal treatment needed at this time to decrease bleeding  Await ultrasound results to determine further treatment.  Will continue to follow with you    Subjective   Patient is a 38-year-old female  1 para 0 with a D&C decades ago.  She has had regular menses and normal up to .  Then with increasing menstrual flow and evaluated and found to have a enlarged fibroid uterus.  In  had a laparoscopic and hysteroscopic resection of fibroids.  For approximately 6 months or more she had an improved menstrual flow situation and now progressively increasing over the past year and a half or 2.  She will bleed for weeks at a time and then stop for a few weeks.  Is unclear what is a period and what is just plain bleeding.    Does endorse increasing shortness of breath over the past month.    Denies any urinary or bowel symptoms.  Also using a significant amount of Motrin for both pain control and cycle control.        Obstetrical History   OB History   No obstetric history on file.       Past Medical History  Past Medical History:   Diagnosis Date    Hypertension     Personal history of other endocrine, nutritional and metabolic disease 2016    History of obesity    Prediabetes 2016    Prediabetes    Unspecified disorder of nose and nasal sinuses 2016    Sinus trouble        Past Surgical History   Past Surgical History:   Procedure Laterality Date    DILATION AND CURETTAGE OF UTERUS  2016    Dilation And Curettage    HIP SURGERY  2016    Hip Surgery    OTHER SURGICAL HISTORY  2016    History Of Prior Surgery       Social History  Social History     Tobacco Use    Smoking status: Every Day     Types: Cigarettes    Smokeless tobacco: Never   Substance Use Topics    Alcohol use: Not on file      Substance and Sexual Activity   Drug Use Not on file       Allergies  Morphine and Oxycodone-acetaminophen     Medications  (Not in a hospital admission)      Objective    Last Vitals  Temp Pulse Resp BP MAP O2 Sat   36.9 °C (98.5 °F) 90 16 (!) 151/134   95 %     Physical Examination  Abdomen is soft nondistended bowel sounds positive no masses palpated.    Pelvic exam deferred as she is on the way for an ultrasound and will await those results    Lab Review  Lab Results   Component Value Date    WBC 13.3 (H) 06/04/2024    HGB 6.7 (L) 06/04/2024    HCT 25.3 (L) 06/04/2024     (H) 06/04/2024     Lab Results   Component Value Date    GLUCOSE 103 (H) 06/04/2024     06/04/2024    K 3.7 06/04/2024     (H) 06/04/2024    CO2 23 06/04/2024    ANIONGAP 12 06/04/2024    BUN 13 06/04/2024    CREATININE 1.45 (H) 06/04/2024    EGFR 47 (L) 06/04/2024    CALCIUM 8.6 06/04/2024    ALBUMIN 3.8 06/03/2024    PROT 7.0 06/03/2024    ALKPHOS 72 06/03/2024    ALT 30 06/03/2024    AST 25 06/03/2024    BILITOT 0.5 06/03/2024     Lab Results   Component Value Date     (H) 06/04/2024

## 2024-06-04 NOTE — PROGRESS NOTES
Pharmacy Medication History Review    Per patient.     Gretel Archibald is a 38 y.o. female admitted for Anemia, unspecified type. Pharmacy reviewed the patient's [id number] medications and allergies for accuracy.    The list below reflectives the updated PTA list. Please review each medication in order reconciliation for additional clarification and justification.       The list below reflectives the updated allergy list. Please review each documented allergy for additional clarification and justification.  Allergies  Reviewed by Candy Gill PA-C on 6/3/2024        Severity Reactions Comments    Morphine Not Specified Hives     Oxycodone-acetaminophen Not Specified Unknown             Below are additional concerns with the patient's PTA list.  Prior to Admission Medications   Prescriptions Last Dose Informant   albuterol 90 mcg/actuation inhaler     Sig: Inhale 1-2 puffs every 4 hours if needed for wheezing or shortness of breath. EVERY 4-6 HOURS AS NEEDED AND AS DIRECTED.   amLODIPine (Norvasc) 10 mg tablet 6/3/2024    Sig: Take 1 tablet (10 mg) by mouth once daily.   aspirin-acetaminophen-caffeine (Excedrin Migraine) 250-250-65 mg tablet     Sig: Take 1 tablet by mouth once daily as needed for headaches.   carvedilol (Coreg) 25 mg tablet 6/3/2024    Sig: Take 1 tablet (25 mg) by mouth 2 times a day with meals.   ferrous sulfate 325 (65 Fe) MG tablet 6/2/2024    Sig: Take 1 tablet by mouth 2 times a day.   ibuprofen 200 mg tablet     Sig: Take 3 tablets (600 mg) by mouth every 6 hours if needed for mild pain (1 - 3).   losartan-hydrochlorothiazide (Hyzaar) 100-25 mg tablet 6/3/2024    Sig: Take 1 tablet by mouth once daily.   spironolactone (Aldactone) 50 mg tablet 6/3/2024    Sig: Take 1 tablet (50 mg) by mouth once daily.      Facility-Administered Medications: None        Brittney Esquivel

## 2024-06-04 NOTE — PROGRESS NOTES
Home with  & children      06/04/24 1152   Current Planned Discharge Disposition   Current Planned Discharge Disposition Home

## 2024-06-04 NOTE — PROGRESS NOTES
06/04/24 0643   [facility] Disability Status   Are you deaf or do you have serious difficulty hearing? N   Are you blind or do you have serious difficulty seeing, even when wearing glasses? N   Because of a physical, mental, or emotional condition, do you have serious difficulty concentrating, remembering, or making decisions? (5 years old or older) N   Do you have serious difficulty walking or climbing stairs? N   Do you have serious difficulty dressing or bathing? N   Because of a physical, mental, or emotional condition, do you have serious difficulty doing errands alone such as visiting the doctor? N

## 2024-06-04 NOTE — ED NOTES
Community Resource Name: Patient needs resource from [facility] to help with insurance and PCP.  Phone Number:   Staff Member:  Lucille [facility]    Discussed the following topics on behalf of the patient:  []  Behavioral Health Assistance     []  Case Management  []   Assistance  []  Digital Equity Assistance  []  Dental Health Assistance  []  Education Assistance  []  Employment Assistance  []  Financial Strain Relief Assistance  []  Food Insecurity Assistance  [x]  Healthcare Coverage Assistance  []  Housing Stability Assistance  []  IP Violence Relief Assistance  []  Legal Assistance  []  Physical Activity Assistance  []  Social Connection Assistance  []  Stress Relief Assistance   []  Substance Abuse Assistance  []  Transportation Assistance  []  Utility Assistance  [x]  Other: [insert comment here]  Patient has no insurance and no PCP.  Next Steps:         DERIAN Madrigal  CHW talked to patient regarding no insurance and no primary care physician. CHW mention the various programs [facility] offers regarding assistance with medical bills, insurance or no insurance issues. CHW gave patient the HERS form to complete and explained that someone from [facility] will follow-up. Patient expressed appreciation.         DERIAN Madrigal  06/04/24 0935

## 2024-06-04 NOTE — CONSULTS
"Inpatient consult to Cardiology  Consult performed by: Sanaz Slater, APRN-CNP  Consult ordered by: Lucas Ochoa MD  Reason for consult: chest pain        History Of Present Illness:    Gretel Archibald is a 38 y.o. female presenting with a past medical history iron deficiency anemia, asthma bronchitis, hypertension, CKD, thyroid disorder and abnormal vaginal bleeding.  She presented to [facility] ED with chest pain, shortness of breath and anemia.  Of note hemoglobin on admit was 4.2 she was ordered 2 units of PRBCs.  Cardiology is now consulted for \"chest pain\"    Patient claims her last couple weeks she has had increased dyspnea on exertion and shortness of breath.  Since she cannot walk to the bathroom without getting short of breath.  She endorses a slight chest \"heaviness\" claims she is just unable to catch her breath.  She also says her legs \"swelled up\" a couple days ago and have not gone down.  She is also experiencing heavy menstrual period right now; she has had problems with fibroids in the past.  She follows with a PCP.  She takes all her hypertension meds on a regular basis.  She does not follow with a cardiologist.    Afebrile, heart rate 81, blood pressure on admit 175/97, 95% on room air.  Notable labs on admission BUNs/CR 13/1.45, , high sensitive troponins 21/22/26(previous troponin level 42 in the past), WBC on admit 12.4 hemoglobin on admit 4.2/18.6 now status post 2 RBC PRBCs 6.7/25.3, chest x-ray showed cardiomegaly no congestion.  EKG shows sinus tachycardia no acute signs of ischemia.    Home cardiovascular meds include amlodipine 10 mg daily, Coreg 25 mg twice daily, losartan/HCTZ 100/25 mg daily, and Aldactone 50 mg daily.      Past Cardiology Tests (Last 3 Years):  TTE 6/2022    1. The left ventricular systolic function is normal with a 60-65% estimated ejection fraction.   2. Spectral Doppler shows a pseudonormal pattern of left ventricular diastolic filling.   3. There " is moderate to severe concentric left ventricular hypertrophy.   4. The left atrium is moderately dilated.   5. Mildly elevated RVSP.      Past Medical History:  She has a past medical history of Hypertension, Personal history of other endocrine, nutritional and metabolic disease (2016), Prediabetes (2016), and Unspecified disorder of nose and nasal sinuses (2016).    Past Surgical History:  She has a past surgical history that includes Other surgical history (2016); Dilation and curettage of uterus (2016); and Hip surgery (2016).      Social History:  She reports that she has been smoking cigarettes. She has never used smokeless tobacco. No history on file for alcohol use and drug use.    Family History:  Family History   Problem Relation Name Age of Onset    Hypertension Mother      Bone cancer Father      COPD Father      Asthma Brother      Breast cancer Maternal Grandmother          Allergies:  Morphine and Oxycodone-acetaminophen    ROS:  10 point review of systems including (Constitutional, Eyes, ENMT, Respiratory, Cardiac, Gastrointestinal, Neurological, Psychiatric, and Hematologic) was performed and is otherwise negative.    Objective Data:  Last Recorded Vitals:  Vitals:    24 0830 24 0845 24 0857 24 0900   BP:   (!) 187/94 (!) 151/134   Pulse: 80 84 90    Resp:       Temp:       TempSrc:       SpO2: 96% (!) 91%  (!) 91%   Weight:       Height:         Medical Gas Therapy: Supplemental oxygen  O2 Delivery Method: Nasal cannula  Weight  Av.8 kg (220 lb)  Min: 99.8 kg (220 lb)  Max: 99.8 kg (220 lb)      LABS:  CMP:  Results from last 7 days   Lab Units 24  0443 24  1542   SODIUM mmol/L 139 142   POTASSIUM mmol/L 3.7 3.9   CHLORIDE mmol/L 108* 110*   CO2 mmol/L 23 24   ANION GAP mmol/L 12 12   BUN mg/dL 13 12   CREATININE mg/dL 1.45* 1.44*   EGFR mL/min/1.73m*2 47* 48*   MAGNESIUM mg/dL  --  2.40   ALBUMIN g/dL  --  3.8   ALT U/L   --  30   AST U/L  --  25   BILIRUBIN TOTAL mg/dL  --  0.5     CBC:  Results from last 7 days   Lab Units 06/04/24  0443 06/03/24  1542   WBC AUTO x10*3/uL 13.3* 12.4*   HEMOGLOBIN g/dL 6.7* 4.2*   HEMATOCRIT % 25.3* 18.6*   PLATELETS AUTO x10*3/uL 499* 496*   MCV fL 72* 67*     COAG:     ABO:   ABO TYPE   Date Value Ref Range Status   06/03/2024 B  Final     HEME/ENDO:     CARDIAC:   Results from last 7 days   Lab Units 06/04/24  0443 06/03/24  1906 06/03/24  1659 06/03/24  1542   TROPHS ng/L  --  26* 22* 21*   BNP pg/mL 270*  --   --  268*             Last I/O:    Intake/Output Summary (Last 24 hours) at 6/4/2024 0927  Last data filed at 6/4/2024 0410  Gross per 24 hour   Intake 1050 ml   Output --   Net 1050 ml     Net IO Since Admission: 1,050 mL [06/04/24 0927]      Imaging Results:  ECG 12 lead    Result Date: 6/4/2024  Sinus tachycardia Nonspecific ST and T wave abnormality Abnormal ECG When compared with ECG of 18-JUN-2022 12:44, Previous ECG has undetermined rhythm, needs review Nonspecific T wave abnormality no longer evident in Inferior leads    XR chest 1 view    Result Date: 6/3/2024  STUDY: Chest Radiograph;  06/03/2024 INDICATION: Chest pain. COMPARISON: 06/18/2022 XR Chest ACCESSION NUMBER(S): [id number] ORDERING CLINICIAN: PRASANNA ELLSWORTH TECHNIQUE:  Frontal chest was obtained at 16:10 hours. FINDINGS: No acute opacities or effusions are visualized.  Heart size is enlarged.  There is no evidence of a pneumothorax.    1.  Cardiomegaly. Signed by Oli Stiles MD      Inpatient Medications:  Scheduled medications   Medication Dose Route Frequency    amLODIPine  10 mg oral Daily    carvedilol  25 mg oral BID    ferrous sulfate (325 mg ferrous sulfate)  1 tablet oral BID    ipratropium-albuteroL  3 mL nebulization TID    losartan 100 mg, hydroCHLOROthiazide 25 mg for Hyzaar 100/25   oral Daily    pantoprazole  40 mg oral Daily before breakfast    Or    pantoprazole  40 mg intravenous Daily before  breakfast    spironolactone  50 mg oral Daily     PRN medications   Medication    acetaminophen    Or    acetaminophen    Or    acetaminophen    albuterol    benzonatate    melatonin    ondansetron ODT    Or    ondansetron    oxygen     Continuous Medications   Medication Dose Last Rate       Outpatient Medications:  Current Outpatient Medications   Medication Instructions    albuterol 90 mcg/actuation inhaler 1-2 puffs, inhalation, Every 4 hours PRN, EVERY 4-6 HOURS AS NEEDED AND AS DIRECTED.    amLODIPine (NORVASC) 10 mg, oral, Daily    aspirin-acetaminophen-caffeine (Excedrin Migraine) 250-250-65 mg tablet 1 tablet, oral, Daily PRN    carvedilol (COREG) 25 mg, oral, 2 times daily (morning and late afternoon)    ferrous sulfate 325 (65 Fe) MG tablet 1 tablet, oral, 2 times daily    ibuprofen 600 mg, oral, Every 6 hours PRN    losartan-hydrochlorothiazide (Hyzaar) 100-25 mg tablet 1 tablet, oral, Daily    spironolactone (ALDACTONE) 50 mg, oral, Daily       Physical Exam:  General:  Patient is awake, alert, and oriented.  Patient is in no acute distress.  HEENT:  Pupils equal and reactive.  Normocephalic.  Moist mucosa.    Neck:  No thyromegaly.  Normal Jugular Venous Pressure.  Cardiovascular:  Regular rate and rhythm.  Normal S1 and S2.  Pulmonary:  Clear to auscultation bilaterally.  Abdomen:  Soft. Non-tender.   Non-distended.  Positive bowel sounds.  Lower Extremities:  2+ pedal pulses.  +2 lower extremity edema bilateral  Neurologic:  Cranial nerves intact.  No focal deficit.   Skin: Skin warm and dry, normal skin turgor.   Psychiatric: Normal affect.     Assessment/Plan   Gretel Archibald is a 38 y.o. female presenting with a past medical history iron deficiency anemia, asthma bronchitis, CKD, hypertension, thyroid disorder and abnormal vaginal bleeding.  She presented to [facility] ED with chest pain, shortness of breath and anemia.  Of note hemoglobin on admit was 4.2 she was ordered 2 units of PRBCs.   "Cardiology is now consulted for \"chest pain\"    Home cardiovascular meds include amlodipine 10 mg daily, Coreg 25 mg twice daily, losartan/HCTZ 100/25 mg daily, and Aldactone 50 mg daily.    #Chest pain 2/2 likely acute anemia   #Elevated troponin 2/2 acute anemia-hemoglobin on admit was 4.2, high sensitive troponins 20/22/26 low flat trend (Non-MI troponin elevation / acute non-traumatic myocardial injury. Core measures do not apply), EKG is nonischemic, likely not ACS in nature.  #History of hypertension-elevated on admit-> will monitor and see where she follows out  #Rule out acute heart failure 2/2 acute anemia-admit BNP space to 270, hypervolemic on exam, chest xray cardiomegaly no congestion     RECS:  - We will obtain a transthoracic echocardiogram for structural evaluation including ejection fraction, assessment of regional wall motion abnormalities or valvular disease, and further evaluation of hemodynamics.    -Will give 1 dose Lasix 40 mg IV  -Will monitor blood pressures and adjust as needed  -c/w home meds for now   -May need outpatient ischemic workup 2/2 multiple risk factors    Code Status:  Full Code    I spent [id number] minutes in the professional and overall care of this patient.        Sanaz Slater, APRN-CNP   "

## 2024-06-05 VITALS
RESPIRATION RATE: 18 BRPM | OXYGEN SATURATION: 96 % | SYSTOLIC BLOOD PRESSURE: 145 MMHG | BODY MASS INDEX: 43.19 KG/M2 | WEIGHT: 220 LBS | HEIGHT: 60 IN | TEMPERATURE: 98.2 F | HEART RATE: 73 BPM | DIASTOLIC BLOOD PRESSURE: 88 MMHG

## 2024-06-05 LAB
ANION GAP SERPL CALC-SCNC: 13 MMOL/L (ref 10–20)
BLOOD EXPIRATION DATE: NORMAL
BLOOD EXPIRATION DATE: NORMAL
BUN SERPL-MCNC: 16 MG/DL (ref 6–23)
CALCIUM SERPL-MCNC: 8.5 MG/DL (ref 8.6–10.3)
CHLORIDE SERPL-SCNC: 103 MMOL/L (ref 98–107)
CO2 SERPL-SCNC: 26 MMOL/L (ref 21–32)
CREAT SERPL-MCNC: 1.36 MG/DL (ref 0.5–1.05)
DISPENSE STATUS: NORMAL
DISPENSE STATUS: NORMAL
EGFRCR SERPLBLD CKD-EPI 2021: 51 ML/MIN/1.73M*2
ERYTHROCYTE [DISTWIDTH] IN BLOOD BY AUTOMATED COUNT: 31.9 % (ref 11.5–14.5)
FERRITIN SERPL-MCNC: 33 NG/ML (ref 8–150)
GLUCOSE SERPL-MCNC: 110 MG/DL (ref 74–99)
HCT VFR BLD AUTO: 32 % (ref 36–46)
HGB BLD-MCNC: 9.1 G/DL (ref 12–16)
IRON SATN MFR SERPL: 22 % (ref 25–45)
IRON SERPL-MCNC: 77 UG/DL (ref 35–150)
MCH RBC QN AUTO: 20.9 PG (ref 26–34)
MCHC RBC AUTO-ENTMCNC: 28.4 G/DL (ref 32–36)
MCV RBC AUTO: 74 FL (ref 80–100)
NRBC BLD-RTO: 2 /100 WBCS (ref 0–0)
PLATELET # BLD AUTO: 430 X10*3/UL (ref 150–450)
POTASSIUM SERPL-SCNC: 3 MMOL/L (ref 3.5–5.3)
PRODUCT BLOOD TYPE: 5100
PRODUCT BLOOD TYPE: 5100
PRODUCT CODE: NORMAL
PRODUCT CODE: NORMAL
RBC # BLD AUTO: 4.35 X10*6/UL (ref 4–5.2)
SODIUM SERPL-SCNC: 139 MMOL/L (ref 136–145)
TIBC SERPL-MCNC: 354 UG/DL (ref 240–445)
UIBC SERPL-MCNC: 277 UG/DL (ref 110–370)
UNIT ABO: NORMAL
UNIT ABO: NORMAL
UNIT NUMBER: NORMAL
UNIT NUMBER: NORMAL
UNIT RH: NORMAL
UNIT RH: NORMAL
UNIT VOLUME: 287
UNIT VOLUME: 350
WBC # BLD AUTO: 13.1 X10*3/UL (ref 4.4–11.3)
XM INTEP: NORMAL
XM INTEP: NORMAL

## 2024-06-05 PROCEDURE — 2500000002 HC RX 250 W HCPCS SELF ADMINISTERED DRUGS (ALT 637 FOR MEDICARE OP, ALT 636 FOR OP/ED): Performed by: PHYSICIAN ASSISTANT

## 2024-06-05 PROCEDURE — 2500000004 HC RX 250 GENERAL PHARMACY W/ HCPCS (ALT 636 FOR OP/ED): Performed by: NURSE PRACTITIONER

## 2024-06-05 PROCEDURE — 80048 BASIC METABOLIC PNL TOTAL CA: CPT | Performed by: PHYSICIAN ASSISTANT

## 2024-06-05 PROCEDURE — 99239 HOSP IP/OBS DSCHRG MGMT >30: CPT | Performed by: STUDENT IN AN ORGANIZED HEALTH CARE EDUCATION/TRAINING PROGRAM

## 2024-06-05 PROCEDURE — 36415 COLL VENOUS BLD VENIPUNCTURE: CPT | Performed by: PHYSICIAN ASSISTANT

## 2024-06-05 PROCEDURE — 2500000001 HC RX 250 WO HCPCS SELF ADMINISTERED DRUGS (ALT 637 FOR MEDICARE OP): Performed by: PHYSICIAN ASSISTANT

## 2024-06-05 PROCEDURE — 2500000002 HC RX 250 W HCPCS SELF ADMINISTERED DRUGS (ALT 637 FOR MEDICARE OP, ALT 636 FOR OP/ED): Performed by: STUDENT IN AN ORGANIZED HEALTH CARE EDUCATION/TRAINING PROGRAM

## 2024-06-05 PROCEDURE — 99232 SBSQ HOSP IP/OBS MODERATE 35: CPT | Performed by: NURSE PRACTITIONER

## 2024-06-05 PROCEDURE — 83540 ASSAY OF IRON: CPT | Performed by: STUDENT IN AN ORGANIZED HEALTH CARE EDUCATION/TRAINING PROGRAM

## 2024-06-05 PROCEDURE — 85027 COMPLETE CBC AUTOMATED: CPT | Performed by: PHYSICIAN ASSISTANT

## 2024-06-05 PROCEDURE — 82728 ASSAY OF FERRITIN: CPT | Mod: AHULAB | Performed by: STUDENT IN AN ORGANIZED HEALTH CARE EDUCATION/TRAINING PROGRAM

## 2024-06-05 PROCEDURE — 2500000001 HC RX 250 WO HCPCS SELF ADMINISTERED DRUGS (ALT 637 FOR MEDICARE OP): Performed by: HOSPITALIST

## 2024-06-05 PROCEDURE — 2500000001 HC RX 250 WO HCPCS SELF ADMINISTERED DRUGS (ALT 637 FOR MEDICARE OP): Performed by: INTERNAL MEDICINE

## 2024-06-05 PROCEDURE — 2500000001 HC RX 250 WO HCPCS SELF ADMINISTERED DRUGS (ALT 637 FOR MEDICARE OP): Performed by: OBSTETRICS & GYNECOLOGY

## 2024-06-05 PROCEDURE — 36430 TRANSFUSION BLD/BLD COMPNT: CPT

## 2024-06-05 PROCEDURE — 2500000005 HC RX 250 GENERAL PHARMACY W/O HCPCS: Performed by: PHYSICIAN ASSISTANT

## 2024-06-05 PROCEDURE — 2500000004 HC RX 250 GENERAL PHARMACY W/ HCPCS (ALT 636 FOR OP/ED): Performed by: STUDENT IN AN ORGANIZED HEALTH CARE EDUCATION/TRAINING PROGRAM

## 2024-06-05 PROCEDURE — 99232 SBSQ HOSP IP/OBS MODERATE 35: CPT | Performed by: OBSTETRICS & GYNECOLOGY

## 2024-06-05 RX ORDER — MEDROXYPROGESTERONE ACETATE 10 MG/1
20 TABLET ORAL DAILY
Qty: 60 TABLET | Refills: 1 | Status: SHIPPED | OUTPATIENT
Start: 2024-06-05 | End: 2024-08-04

## 2024-06-05 RX ORDER — MEDROXYPROGESTERONE ACETATE 10 MG/1
20 TABLET ORAL DAILY
Status: DISCONTINUED | OUTPATIENT
Start: 2024-06-05 | End: 2024-06-05 | Stop reason: HOSPADM

## 2024-06-05 RX ORDER — POTASSIUM CHLORIDE 14.9 MG/ML
20 INJECTION INTRAVENOUS
Status: COMPLETED | OUTPATIENT
Start: 2024-06-05 | End: 2024-06-05

## 2024-06-05 RX ORDER — CLONIDINE HYDROCHLORIDE 0.1 MG/1
0.1 TABLET ORAL EVERY 12 HOURS SCHEDULED
Status: DISCONTINUED | OUTPATIENT
Start: 2024-06-05 | End: 2024-06-05 | Stop reason: HOSPADM

## 2024-06-05 RX ORDER — FUROSEMIDE 10 MG/ML
40 INJECTION INTRAMUSCULAR; INTRAVENOUS ONCE
Status: COMPLETED | OUTPATIENT
Start: 2024-06-05 | End: 2024-06-05

## 2024-06-05 RX ORDER — POTASSIUM CHLORIDE 20 MEQ/1
40 TABLET, EXTENDED RELEASE ORAL ONCE
Status: COMPLETED | OUTPATIENT
Start: 2024-06-05 | End: 2024-06-05

## 2024-06-05 RX ADMIN — FERROUS SULFATE TAB 325 MG (65 MG ELEMENTAL FE) 1 TABLET: 325 (65 FE) TAB at 09:00

## 2024-06-05 RX ADMIN — AMLODIPINE BESYLATE 10 MG: 10 TABLET ORAL at 09:00

## 2024-06-05 RX ADMIN — MEDROXYPROGESTERONE ACETATE 20 MG: 10 TABLET ORAL at 12:55

## 2024-06-05 RX ADMIN — Medication 1 L/MIN: at 00:47

## 2024-06-05 RX ADMIN — POTASSIUM CHLORIDE 40 MEQ: 1500 TABLET, EXTENDED RELEASE ORAL at 08:58

## 2024-06-05 RX ADMIN — SPIRONOLACTONE 50 MG: 25 TABLET ORAL at 08:58

## 2024-06-05 RX ADMIN — PANTOPRAZOLE SODIUM 40 MG: 40 TABLET, DELAYED RELEASE ORAL at 06:10

## 2024-06-05 RX ADMIN — HYDRALAZINE HYDROCHLORIDE 10 MG: 10 TABLET, FILM COATED ORAL at 00:57

## 2024-06-05 RX ADMIN — IRON SUCROSE 300 MG: 20 INJECTION, SOLUTION INTRAVENOUS at 12:55

## 2024-06-05 RX ADMIN — FUROSEMIDE 40 MG: 10 INJECTION, SOLUTION INTRAMUSCULAR; INTRAVENOUS at 11:53

## 2024-06-05 RX ADMIN — CLONIDINE HYDROCHLORIDE 0.1 MG: 0.1 TABLET ORAL at 02:03

## 2024-06-05 RX ADMIN — HYDROCHLOROTHIAZIDE: 25 TABLET ORAL at 08:58

## 2024-06-05 RX ADMIN — ACETAMINOPHEN 650 MG: 325 TABLET ORAL at 09:15

## 2024-06-05 RX ADMIN — POTASSIUM CHLORIDE 20 MEQ: 14.9 INJECTION, SOLUTION INTRAVENOUS at 11:17

## 2024-06-05 RX ADMIN — CLONIDINE HYDROCHLORIDE 0.1 MG: 0.1 TABLET ORAL at 09:00

## 2024-06-05 RX ADMIN — POTASSIUM CHLORIDE 20 MEQ: 14.9 INJECTION, SOLUTION INTRAVENOUS at 09:00

## 2024-06-05 RX ADMIN — CARVEDILOL 25 MG: 25 TABLET, FILM COATED ORAL at 09:00

## 2024-06-05 ASSESSMENT — COGNITIVE AND FUNCTIONAL STATUS - GENERAL
DAILY ACTIVITIY SCORE: 24
MOBILITY SCORE: 24

## 2024-06-05 ASSESSMENT — PAIN SCALES - GENERAL
PAINLEVEL_OUTOF10: 0 - NO PAIN
PAINLEVEL_OUTOF10: 3
PAINLEVEL_OUTOF10: 0 - NO PAIN
PAINLEVEL_OUTOF10: 0 - NO PAIN

## 2024-06-05 ASSESSMENT — PAIN - FUNCTIONAL ASSESSMENT
PAIN_FUNCTIONAL_ASSESSMENT: 0-10

## 2024-06-05 ASSESSMENT — PAIN DESCRIPTION - LOCATION: LOCATION: HEAD

## 2024-06-05 NOTE — PROGRESS NOTES
Subjective Data:  TTE pending  H and H 9.132 s/p 3 PRBC  Started on clonidine .1mg BID overnight for BP  -Feels better still with lower extremity swelling; breathing is better though.        Objective Data:  Last Recorded Vitals:  Vitals:    24 0000 24 0051 24 0400 24 0850   BP: 156/86 (!) 182/101 168/90 164/88   BP Location: Right arm  Right arm Right arm   Patient Position: Sitting  Lying Sitting   Pulse: 86 89  78   Resp:  16  18   Temp: 37.1 °C (98.7 °F) 36.9 °C (98.4 °F) 36.6 °C (97.8 °F) 36.8 °C (98.2 °F)   TempSrc: Temporal Temporal Temporal Temporal   SpO2: 97% 95% 96% 96%   Weight:       Height:         Medical Gas Therapy: None (Room air)  O2 Delivery Method: Nasal cannula  Weight  Av.8 kg (220 lb)  Min: 99.8 kg (220 lb)  Max: 99.8 kg (220 lb)    LABS:  CMP:  Results from last 7 days   Lab Units 24  0524  1542   SODIUM mmol/L 139 139 142   POTASSIUM mmol/L 3.0* 3.7 3.9   CHLORIDE mmol/L 103 108* 110*   CO2 mmol/L 26 23 24   ANION GAP mmol/L 13 12 12   BUN mg/dL 16 13 12   CREATININE mg/dL 1.36* 1.45* 1.44*   EGFR mL/min/1.73m*2 51* 47* 48*   MAGNESIUM mg/dL  --   --  2.40   ALBUMIN g/dL  --   --  3.8   ALT U/L  --   --  30   AST U/L  --   --  25   BILIRUBIN TOTAL mg/dL  --   --  0.5     CBC:  Results from last 7 days   Lab Units 24  1542   WBC AUTO x10*3/uL 13.1* 13.3* 12.4*   HEMOGLOBIN g/dL 9.1* 6.7* 4.2*   HEMATOCRIT % 32.0* 25.3* 18.6*   PLATELETS AUTO x10*3/uL 430 499* 496*   MCV fL 74* 72* 67*     COAG:     ABO:   ABO TYPE   Date Value Ref Range Status   2024 B  Final     HEME/ENDO:  Results from last 7 days   Lab Units 24  0541   IRON SATURATION % 22*      CARDIAC:   Results from last 7 days   Lab Units 24  0443 24  1906 24  1659 24  1542   TROPHS ng/L  --  26* 22* 21*   BNP pg/mL 270*  --   --  268*             Last I/O:    Intake/Output Summary (Last 24 hours) at  6/5/2024 1021  Last data filed at 6/5/2024 0051  Gross per 24 hour   Intake 1257.47 ml   Output 800 ml   Net 457.47 ml     Net IO Since Admission: 1,507.47 mL [06/05/24 1021]      Imaging Results:  US PELVIS TRANSABDOMINAL WITH TRANSVAGINAL    Result Date: 6/4/2024  Interpreted By:  Mario Dillon, STUDY: US PELVIS TRANSABDOMINAL WITH TRANSVAGINAL; 6/4/2024 11:00 am   INDICATION: Signs/Symptoms:Pain; anemia, bleeding. History of vaginal bleeding with clots for 4 years. History of fibroids. Status post myomectomy 2022.   COMPARISON: 05/25/2021   ACCESSION NUMBER(S): [id number]   ORDERING CLINICIAN: CHICHI LINDER   TECHNIQUE: Grayscale and color Doppler imaging of the pelvis were performed. Transabdominal technique was utilized as well as transvaginal ultrasound to better visualize the adnexa.   FINDINGS: COMMENTS: Technologist note states that the exam is limited due to the patient's condition.   Uterus: Size: 10.4 cm x 5.4 cm x 5.9 cm. There is a 5.1 cm heterogeneous submucosal fibroid obscuring the endometrium within the body of the uterus posteriorly. Endometrial Thickness: 2 mm. Endometrium is predominantly obscured by a submucosal posterior fibroid of the body of the uterus. A minimal amount of fluid is noted within the lower uterine segment endometrial cavity.   Ovaries: There is normal color-flow with no sign of ovarian torsion. Right ovary: 3.5 cm x 2.7 cm x 2.4 cm. Follicular cysts are present within the right ovary. Right ovary volume: 11.9 cm3 Left ovary: Is obscured by bowel gas.     There is a small amount of free fluid within the cul-de-sac.       1. Large submucosal posterior fibroid involving the body of the uterus, obscuring the endometrial stripe. 2. Small amount of fluid within the lower uterine segment endometrial cavity. 3. Normal appearance of the right ovary with simple appearing follicular cyst noted. 4. Left ovary is obscured by bowel gas. 5. Physiologic amount of free fluid within the  "cul-de-sac.   MACRO: none   Signed by: Mario Dillon 6/4/2024 11:28 AM Dictation workstation:   [id number]    ECG 12 lead    Result Date: 6/4/2024  Sinus tachycardia Nonspecific ST and T wave abnormality Abnormal ECG When compared with ECG of 18-JUN-2022 12:44, Previous ECG has undetermined rhythm, needs review Nonspecific T wave abnormality no longer evident in Inferior leads    XR chest 1 view    Result Date: 6/3/2024  STUDY: Chest Radiograph;  06/03/2024 INDICATION: Chest pain. COMPARISON: 06/18/2022 XR Chest ACCESSION NUMBER(S): [id number] ORDERING CLINICIAN: PRASANNA ELLSWORTH TECHNIQUE:  Frontal chest was obtained at 16:10 hours. FINDINGS: No acute opacities or effusions are visualized.  Heart size is enlarged.  There is no evidence of a pneumothorax.    1.  Cardiomegaly. Signed by Oli Stiles MD          Past Cardiology Tests (Last 3 Years):  EKG:  Results for orders placed during the hospital encounter of 06/03/24    ECG 12 lead (Preliminary)  This result has not been signed. Information might be incomplete.    Narrative  Sinus tachycardia  Nonspecific ST and T wave abnormality  Abnormal ECG  When compared with ECG of 18-JUN-2022 12:44,  Previous ECG has undetermined rhythm, needs review  Nonspecific T wave abnormality no longer evident in Inferior leads    Echo:  No results found for this or any previous visit.    Ejection Fractions:  No results found for: \"EF\"  Cath:  No results found for this or any previous visit.    Stress Test:  No results found for this or any previous visit.    Cardiac Imaging:  No results found for this or any previous visit.      Inpatient Medications:  Scheduled medications   Medication Dose Route Frequency    amLODIPine  10 mg oral Daily    carvedilol  25 mg oral BID    cloNIDine  0.1 mg oral q12h [facility]    ferrous sulfate (325 mg ferrous sulfate)  1 tablet oral BID    ipratropium-albuteroL  3 mL nebulization TID    losartan 100 mg, hydroCHLOROthiazide 25 mg for Hyzaar 100/25   " "oral Daily    medroxyPROGESTERone  20 mg oral Daily    pantoprazole  40 mg oral Daily before breakfast    Or    pantoprazole  40 mg intravenous Daily before breakfast    perflutren lipid microspheres  0.5-10 mL of dilution intravenous Once in imaging    perflutren protein A microsphere  0.5 mL intravenous Once in imaging    potassium chloride  20 mEq intravenous q2h    spironolactone  50 mg oral Daily    sulfur hexafluoride microsphr  2 mL intravenous Once in imaging     PRN medications   Medication    acetaminophen    Or    acetaminophen    Or    acetaminophen    albuterol    benzonatate    hydrALAZINE    melatonin    ondansetron ODT    Or    ondansetron    oxygen     Continuous Medications   Medication Dose Last Rate       Physical Exam:  General:  Patient is awake, alert, and oriented.  Patient is in no acute distress.  HEENT:  Pupils equal and reactive.  Normocephalic.  Moist mucosa.    Neck:  No thyromegaly.  Normal Jugular Venous Pressure.  Cardiovascular:  Regular rate and rhythm.  Normal S1 and S2.  Pulmonary:  Clear to auscultation bilaterally.  Abdomen:  Soft. Non-tender.   Non-distended.  Positive bowel sounds.  Lower Extremities:  2+ pedal pulses. No LE edema.  Neurologic:  Cranial nerves intact.  No focal deficit.   Skin: Skin warm and dry, normal skin turgor.   Psychiatric: Normal affect.     Assessment/Plan   Gretel Archibald is a 38 y.o. female presenting with a past medical history iron deficiency anemia, asthma bronchitis, CKD, hypertension, thyroid disorder and abnormal vaginal bleeding.  She presented to [facility] ED with chest pain, shortness of breath and anemia.  Of note hemoglobin on admit was 4.2 she was ordered 2 units of PRBCs.  Cardiology is now consulted for \"chest pain\"     Home cardiovascular meds include amlodipine 10 mg daily, Coreg 25 mg twice daily, losartan/HCTZ 100/25 mg daily, and Aldactone 50 mg daily.     #Chest pain 2/2 likely acute anemia   #Elevated troponin 2/2 acute " anemia-hemoglobin on admit was 4.2, high sensitive troponins 20/22/26 low flat trend (Non-MI troponin elevation / acute non-traumatic myocardial injury. Core measures do not apply), EKG is nonischemic, likely not ACS in nature.  #History of hypertension-elevated on admit-> will monitor and see where she follows out  #Rule out acute heart failure 2/2 acute anemia-admit BNP space to 270, hypervolemic on exam, chest xray cardiomegaly no congestion      RECS:  -TTE can be done outpatient   -Will give another dose Lasix 40 mg IV this am   -c/w home meds for now   -can c/w added clonidine  -no cardiac barriers to discharge       Code Status:  Full Code     I spent 40 minutes in the professional and overall care of this patient.    Code Status:  Full Code    I spent 40 minutes in the professional and overall care of this patient.        Sanaz Slater, APRN-CNP

## 2024-06-05 NOTE — CONSULTS
Obstetrical Consult    Reason for Consult: Fibroid uterus and menorrhagia severe anemia    Assessment/Plan    No current bleeding.  Begin Provera 20 mg daily  Follow-up in the office for Mirena IUD placement  Patient is aware that she may eventually need a myomectomy or hysterectomy.  May discharge from a GYN perspective    Subjective   Patient feels well.  Posttransfusion.  No vaginal bleeding or discharge.  Still with some urinary frequency    Obstetrical History   OB History   No obstetric history on file.       Past Medical History  Past Medical History:   Diagnosis Date    Hypertension     Personal history of other endocrine, nutritional and metabolic disease 11/21/2016    History of obesity    Prediabetes 11/21/2016    Prediabetes    Unspecified disorder of nose and nasal sinuses 02/03/2016    Sinus trouble        Past Surgical History   Past Surgical History:   Procedure Laterality Date    DILATION AND CURETTAGE OF UTERUS  11/21/2016    Dilation And Curettage    HIP SURGERY  11/21/2016    Hip Surgery    OTHER SURGICAL HISTORY  02/07/2016    History Of Prior Surgery       Social History  Social History     Tobacco Use    Smoking status: Every Day     Types: Cigarettes    Smokeless tobacco: Never   Substance Use Topics    Alcohol use: Not on file     Substance and Sexual Activity   Drug Use Not on file       Allergies  Morphine and Oxycodone-acetaminophen     Medications  Medications Prior to Admission   Medication Sig Dispense Refill Last Dose    amLODIPine (Norvasc) 10 mg tablet Take 1 tablet (10 mg) by mouth once daily. 30 tablet 5 6/3/2024    carvedilol (Coreg) 25 mg tablet Take 1 tablet (25 mg) by mouth 2 times a day with meals. 180 tablet 3 6/3/2024    ferrous sulfate 325 (65 Fe) MG tablet Take 1 tablet by mouth 2 times a day.   6/2/2024    losartan-hydrochlorothiazide (Hyzaar) 100-25 mg tablet Take 1 tablet by mouth once daily.   6/3/2024    spironolactone (Aldactone) 50 mg tablet Take 1 tablet (50 mg)  by mouth once daily. 90 tablet 0 6/3/2024    albuterol 90 mcg/actuation inhaler Inhale 1-2 puffs every 4 hours if needed for wheezing or shortness of breath. EVERY 4-6 HOURS AS NEEDED AND AS DIRECTED.       aspirin-acetaminophen-caffeine (Excedrin Migraine) 250-250-65 mg tablet Take 1 tablet by mouth once daily as needed for headaches.       ibuprofen 200 mg tablet Take 3 tablets (600 mg) by mouth every 6 hours if needed for mild pain (1 - 3).          Objective    Last Vitals  Temp Pulse Resp BP MAP O2 Sat   36.6 °C (97.8 °F) 89 16 168/90   96 %     Physical Examination  Deferred    Ultrasound reviewed.  Large fundal fibroid.  No adnexal masses.    Lab Review  Lab Results   Component Value Date    WBC 13.1 (H) 06/05/2024    HGB 9.1 (L) 06/05/2024    HCT 32.0 (L) 06/05/2024     06/05/2024

## 2024-06-05 NOTE — PROGRESS NOTES
06/05/24 2958   Discharge Planning   Patient expects to be discharged to: [facility]         Patient came in with vaginal bleeding and her H/H was low 18.6/4.2. Patient received blood transfusion. Her H/H this morning is stable ,at 32.0/ 9.1. Patient has active discharge order. She is going home. Patient denies any needs going home.

## 2024-06-05 NOTE — CARE PLAN
The patient's goals for the shift include      The clinical goals for the shift include remain HDS    Over the shift, the patient did not make progress toward the following goals. Barriers to progression include   Problem: Safety  Goal: Patient will be injury free during hospitalization  Outcome: Progressing  Goal: I will remain free of falls  Outcome: Progressing     Problem: Daily Care  Goal: Daily care needs are met  Outcome: Progressing     Problem: Psychosocial Needs  Goal: Demonstrates ability to cope with hospitalization/illness  Outcome: Progressing  Goal: Collaborate with me, my family, and caregiver to identify my specific goals  Outcome: Progressing     Problem: Discharge Barriers  Goal: My discharge needs are met  Outcome: Progressing   . Recommendations to address these barriers include .

## 2024-06-05 NOTE — DISCHARGE SUMMARY
Discharge Diagnosis  Anemia, unspecified type    Issues Requiring Follow-Up  -Please continue taking oral iron (in addition to the single dose of IV iron inpatient)  -Please follow with your gynecologist Dr. Hamilton. We have sent medroxyprogesterone 20 mg daily to your pharmacy   -Please obtain an echocardiogram to evaluate your heart. An order has been placed on your behalf  -Please follow with cardiology   -Please establish with Dr. Gonzalez as Dr. Lantz-DiGeorge has left her practice    Discharge Meds     Your medication list        CONTINUE taking these medications        Instructions Last Dose Given Next Dose Due   albuterol 90 mcg/actuation inhaler           amLODIPine 10 mg tablet  Commonly known as: Norvasc      Take 1 tablet (10 mg) by mouth once daily.       carvedilol 25 mg tablet  Commonly known as: Coreg      Take 1 tablet (25 mg) by mouth 2 times a day with meals.       ferrous sulfate (325 mg ferrous sulfate) tablet           losartan-hydrochlorothiazide 100-25 mg tablet  Commonly known as: Hyzaar           spironolactone 50 mg tablet  Commonly known as: Aldactone      Take 1 tablet (50 mg) by mouth once daily.              STOP taking these medications      aspirin-acetaminophen-caffeine 250-250-65 mg tablet  Commonly known as: Excedrin Migraine        ibuprofen 200 mg tablet                 Test Results Pending At Discharge  Pending Labs       Order Current Status    Ferritin Preliminary result            Hospital Course  Ms. Gretel Archibald is a 38 y.o. female presenting with anemia, chest pain, shortness of breath, elevated troponin, very mild acute on chronic CKD.  Patient was found to have a hemoglobin here of 4.2.  Patient has a history of abnormal vaginal bleeding and iron deficiency anemia as well as asthmatic bronchitis and hypertension.  Patient stopped taking her iron 5 or 6 months ago and just restarted about 3 days ago.    She was transfused with 4u with appropriate incrementation to 9.1.  Also given single dose 300 mg Iron Sucrose. Gyn was consulted, US was performed and demonstrated large submucosal posterior fibroid involving the body of the uterus, obscuring the endometrial stripe. Her bleeding stopped and she was started on medroxyprogesterone at 20 mg daily.    Patient also had symptomatic chest pain while anemic. Cardiology consulted, TTE requested. She was discharged to home with cardiology/TTE ordered as outpatient.    Pertinent Physical Exam At Time of Discharge  Gen: A&O, NAD  Head: Normocephalic, atraumatic  Eyes: no scleral icterus  ENT: mucous membranes moist, no oropharyngeal lesions  Resp: Lungs CTAB  Cardiac: Normal rate, regular rhythm, no murmurs appreciated  Abdomen: Soft, nondistended, nontender, +BS  Neuro: CNII-XII grossly intact  Psych: appropriate mood & affect  Skin: warm, dry, no apparent rashes    Outpatient Follow-Up  Future Appointments   Date Time Provider Department Center   7/2/2024  9:30 AM April RAMOS MD [id number]     Greater than 30 min spent in discharge planning and care of this patient.      Mark Rios MD

## 2024-06-05 NOTE — DISCHARGE INSTRUCTIONS
Please continue taking oral iron (in addition to the single dose of IV iron inpatient)  -Please follow with your gynecologist Dr. Hamilton. We have sent medroxyprogesterone 20 mg daily to your pharmacy   -Please obtain an echocardiogram to evaluate your heart. An order has been placed on your behalf  -Please follow with cardiology   -Please establish with Dr. Gonzalez as Dr. Lantz-DiGeorge has left her practice

## 2024-06-06 NOTE — DOCUMENTATION CLARIFICATION NOTE
PATIENT:               YOUNG BATES  ACCT #:                  [id number]  MRN:                       [id number]  :                       1985  ADMIT DATE:       6/3/2024 4:26 PM  DISCH DATE:        2024 3:50 PM  RESPONDING PROVIDER #:        [id number]          PROVIDER RESPONSE TEXT:    Acute Kidney Injury is ruled out    CDI QUERY TEXT:    Clarification      Instruction:    Based on your assessment of the patient and the clinical information, please provide the requested documentation by clicking on the appropriate radio button and enter any additional information if prompted.    Question: Please clarify the diagnosis of Acute Kidney Injury    When answering this query, please exercise your independent professional judgment. The fact that a question is being asked, does not imply that any particular answer is desired or expected.    The patient's clinical indicators include:  Clinical Information: Admitted with anemia 2/2 recurrent fibroid uterus with menorrhagia    Documented Diagnosis:  MCAIEL on 24 medicine progress note    Clinical Indicators and Documentation:  -Vital Signs-ED note: Temp-36.6, HR-102, RR-18, BP-175/97, SpO2-94 percent on RA  -Baseline Creatinine: unknown  -SCr lab values: 6/3/24-24: 1.44, 1.45, 1.36  -Urine Output: not recorded  -Electrolyte lab values: 6/3/24 Sodium-142, Chloride-110, Potassium-3.9  -Nephrology consult: no    Treatment: IVF's, IV potassium, IV Iron    Risk Factors: blood loss  Options provided:  -- Acute Kidney Injury is ruled out  -- Acute Kidney Injury ruled in as evidenced by, Please specify additional information below  -- Other - I will add my own diagnosis  -- Refer to Clinical Documentation Reviewer    Query created by: Iraida Chatterjee on 2024 6:23 PM      Electronically signed by:  CHICHI LINDER MD 2024 5:47 PM

## 2024-06-09 LAB
ATRIAL RATE: 106 BPM
P AXIS: 62 DEGREES
P OFFSET: 203 MS
P ONSET: 136 MS
PR INTERVAL: 168 MS
Q ONSET: 220 MS
QRS COUNT: 18 BEATS
QRS DURATION: 72 MS
QT INTERVAL: 352 MS
QTC CALCULATION(BAZETT): 467 MS
QTC FREDERICIA: 425 MS
R AXIS: 13 DEGREES
T AXIS: 81 DEGREES
T OFFSET: 396 MS
VENTRICULAR RATE: 106 BPM

## 2024-07-02 ENCOUNTER — APPOINTMENT (OUTPATIENT)
Dept: OBSTETRICS AND GYNECOLOGY | Facility: CLINIC | Age: 39
End: 2024-07-02

## 2024-08-07 ENCOUNTER — TELEPHONE (OUTPATIENT)
Dept: OBSTETRICS AND GYNECOLOGY | Facility: CLINIC | Age: 39
End: 2024-08-07

## 2024-08-07 NOTE — TELEPHONE ENCOUNTER
Patient is taking 20mg per day is the prescriptions. Patient needs a refill. She has an appointment upcoming for an IUD. But needs the medication before that. She is also having a heavy flow.

## 2024-08-09 DIAGNOSIS — N93.9 VAGINAL BLEEDING: ICD-10-CM

## 2024-08-09 RX ORDER — MEDROXYPROGESTERONE ACETATE 10 MG/1
20 TABLET ORAL DAILY
Qty: 60 TABLET | Refills: 1 | Status: SHIPPED | OUTPATIENT
Start: 2024-08-09 | End: 2024-10-08

## 2024-08-12 ENCOUNTER — OFFICE VISIT (OUTPATIENT)
Dept: OBSTETRICS AND GYNECOLOGY | Facility: CLINIC | Age: 39
End: 2024-08-12

## 2024-08-12 ENCOUNTER — PREP FOR PROCEDURE (OUTPATIENT)
Dept: OBSTETRICS AND GYNECOLOGY | Facility: CLINIC | Age: 39
End: 2024-08-12

## 2024-08-12 ENCOUNTER — HOSPITAL ENCOUNTER (OUTPATIENT)
Facility: HOSPITAL | Age: 39
Setting detail: OUTPATIENT SURGERY
End: 2024-08-12
Attending: OBSTETRICS & GYNECOLOGY | Admitting: OBSTETRICS & GYNECOLOGY
Payer: MEDICARE

## 2024-08-12 DIAGNOSIS — D25.0 SUBMUCOUS UTERINE FIBROID: Primary | ICD-10-CM

## 2024-08-12 DIAGNOSIS — N93.9 ABNORMAL UTERINE BLEEDING (AUB): Primary | ICD-10-CM

## 2024-08-12 DIAGNOSIS — D25.0 SUBMUCOUS UTERINE FIBROID: ICD-10-CM

## 2024-08-12 PROCEDURE — 99213 OFFICE O/P EST LOW 20 MIN: CPT | Performed by: OBSTETRICS & GYNECOLOGY

## 2024-08-12 ASSESSMENT — ENCOUNTER SYMPTOMS
CARDIOVASCULAR NEGATIVE: 0
ENDOCRINE NEGATIVE: 0
LOSS OF SENSATION IN FEET: 0
PSYCHIATRIC NEGATIVE: 0
ABDOMINAL PAIN: 1
NEUROLOGICAL NEGATIVE: 0
CONSTITUTIONAL NEGATIVE: 0
OCCASIONAL FEELINGS OF UNSTEADINESS: 0
EYES NEGATIVE: 0
GASTROINTESTINAL NEGATIVE: 0
ALLERGIC/IMMUNOLOGIC NEGATIVE: 0
RESPIRATORY NEGATIVE: 0
HEMATOLOGIC/LYMPHATIC NEGATIVE: 0
MUSCULOSKELETAL NEGATIVE: 0

## 2024-08-12 NOTE — PROGRESS NOTES
Subjective   Patient ID: Gretel Archibald is a 38 y.o. female who presents for New Patient Visit (New patient visit/ED follow up/fibroids no pap on file).  HPI  Patient is a 38-year-old female  1 para 0 previously known to the practice seen in consultation at the hospital for heavy bleeding.  Known intracavitary fibroid.  Bleeding was controlled on oral Provera 20 mg daily  Now noticed a mass in her vagina when she was coughing while sitting on the toilet.    Bleeding is under decent control at this time.  No significant pain.      Review of Systems   Gastrointestinal:  Positive for abdominal pain.   Genitourinary:  Positive for menstrual problem and pelvic pain.       Objective   Physical Exam  Pelvic exam: External genitalia Bartholin's urethra and [place]'s normal.  In the vaginal vault is a 3 x 1 cm fibroid prolapsed through the cervix.  On bimanual exam the uterus is slightly enlarged nontender.  Assessment/Plan   Patient with abnormal uterine bleeding currently controlled with Provera 20 mg daily  Prolapsed submucous fibroid.  Will schedule D&C hysteroscopy for removal of fibroid and placement of Mirena IUD for cycle control  Surgery submitted         Mukul Leger MD 24 2:20 PM

## 2024-08-26 NOTE — PREPROCEDURE INSTRUCTIONS
Pre-Op Instructions & Checklist   Your surgery has been scheduled at [facility] at [address] [place], [address], [address], in the [facility]. Parking is to the left of the main entrance.  You will be contacted about the time of your surgery the day before your surgery (if your surgery is on a Monday you will be called the Friday before surgery). If you are unable to answer the phone, a detailed voicemail message will be left. Make sure that your voicemail box is not full so a message can be left. If you have not received a call by 3:00 pm you may call [phone] between the hours of 3:00 and 4:00 pm. Please be available by phone the night before/day of surgery in case there is a change in the schedule which may require you to arrive earlier/later.    14 DAYS BEFORE SURGERY STOP TAKING WEIGHT LOSS MEDICATIONS     7 DAYS BEFORE SURGERY STOP THESE MEDICATIONS:  Multiple Vitamins containing Vitamin E  Herbal supplements, Fish Oil, garlic pills, turmeric, CoQ enzyme  Stop taking aspirin, and aspirin-containing products as well as NSAID's such as Advil, Motrin, Aleve, Ibuprofen. Tylenol is okay to take for pain relief.   If you are currently taking Coumadin/Warfarin, we will have to coordinate that with your PCP &/or the Anticoagulation Clinic.    THE DAY BEFORE SURGERY:  Do not eat any food after midnight the night before surgery.   You are permitted to have no more than 4 ounces of clear liquids such as water, apple juice, plain tea or coffee (no milk or creamer), clear electrolyte-replenishing drinks such as Pedialyte, Gatorade, or Powerade (not yogurt or pulp-containing smoothies or juices such as orange juice) up to 3 hours before your arrival time.    DAY OF SURGERY TAKE THESE MEDICATIONS (if it is not listed, do not take it.)   Take: Amlodipine; carvedilol; spironolactone. Bring your albuterol rescue inhaler with you to the surgery center.  If taking medications in a  tablet/capsule form, take with a small sip of water.    ON THE MORNING OF SURGERY:  *Shower either the night before your surgery or the morning of your surgery  *Do not use moisturizers, creams, lotions or perfume, or make-up.  *Wear comfortable, loose fitting clothing.   *All jewelry and valuables should be left at home.  *Prosthetic devices such as contact lenses, hearing aids, dentures, eyelash extensions, hairpins and body piercings must be removed before surgery. Bring containers for eyeglasses/contacts, dentures, or hearing aids with you.    Diabetics: Please check fasting blood sugars upon waking up.  If fasting blood sugars are <80ml/dl, please drink 100ml/3oz. of apple juice no later than 2 hours prior to surgery.      BRING WITH YOU:   *Photo ID and insurance card  *Current list of medicines and allergies  *Pacemaker/Defibrillator/Heart stent cards  *Copy of your complete Advanced Directive/DHPOA-if applicable     SMOKING:  *Quitting smoking can make a huge difference to your health and recovery from surgery.    *If you need help with quitting, call 7-561Vadxx EnergyQUIT-NOW.  Alcohol:  *No alcoholic beverages for 48 hours before surgery.     AFTER OUTPATIENT SURGERY:  *A responsible adult MUST accompany you at the time of discharge and stay with you for 24 hours after your surgery.  *You may NOT drive yourself home after surgery.  * You may use a taxi or ride sharing service (frestyl, Uber) to return home ONLY if you are accompanied by a friend or family member  *Instructions for resuming your medications will be provided by your surgeon.     CONTACT SURGEON'S OFFICE IF YOU DEVELOP:  * Fever =/> 100.4 F   * New respiratory symptoms (e.g. cough, shortness of breath, respiratory distress, sore throat)  * Recent loss of taste or smell  *Flu like symptoms such as headache, fatigue or gastrointestinal symptoms  * If you develop any open sores, shingles, burning or painful urination   AND/OR:  * You no longer wish to have  the surgery.  * Any other personal circumstances change that may lead to the need to cancel or defer this surgery.  *You were admitted to any hospital within one week of your planned procedure.     If you have any questions regarding these preoperative instructions you may call [phone]. If you have questions regarding you surgical procedure, or post-operative care/recovery please call your surgeon's office.

## 2024-08-26 NOTE — CPM/PAT H&P
CPM/PAT Evaluation       Name: Gretel Archibald   /Age: 1985       TELEMEDICINE ENCOUNTER  Patient was interviewed by telephone for preadmission testing perioperative risk assessment prior to surgery.    DATE OF CONSULT: 2022 for  REFERRING PROVIDER: Dr. Mukul Leger  SURGERY, DATE, AND LENGTH: Uterine biopsy; 09/10/2024; 60 minutes    CHIEF COMPLAINT  Submucous uterine fibroid    HPI  Gretel Archibald is a 38-year-old female with history of heavy uterine bleeding and passing of large clots. Bleeding was controlled on oral Provera 20 mg daily. Patient found to have a 3 x 1 cm uterine fibroid prolapsed into the vaginal vault through the cervix.  Patient with history of prior uterine fibroids and uterine myomectomies x 2.  In 2024 she presented to [facility] ED with chest pain, shortness of breath and anemia. Of note hemoglobin on admit was 4.2 she was transfused with 2 units of PRBCs.       ACTIVE PROBLEMS  Patient Active Problem List   Diagnosis    Bronchitis with bronchospasm    Bronchitis    Abnormal uterine bleeding    Allergic conjunctivitis, bilateral    Anemia    Astigmatism of left eye    Acquired ptosis of both eyelids    Benign essential hypertension    Congenital ptosis of both eyelids    Cough    COVID-19    Daytime somnolence    Deviated nasal septum    Elevated blood-pressure reading, without diagnosis of hypertension    Epiphora    Fatigue    Headache    Hypertrophy of both inferior nasal turbinates    Iron deficiency anemia, unspecified    Obesity, Class III, BMI 40-49.9 (morbid obesity) (Multi)    Fibroid    Adult-onset obesity    Menorrhagia    Migraines    Myopia of right eye    Nasal congestion    Pleuritic chest pain    Pre-diabetes    Sinus pressure    Skin rash    Sore throat    Snoring    Vaginitis    Thyroid disorder    Uterine fibroid    Anemia, unspecified type    Shortness of breath    Chest pain    Asthmatic bronchitis with exacerbation ([facility]-H[facility])    Elevated troponin     Acute on chronic kidney failure (CMS-HCC)    Submucous uterine fibroid        PAST MEDICAL HISTORY  Past Medical History:   Diagnosis Date    Fibroid     Hypertension     Personal history of other endocrine, nutritional and metabolic disease 11/21/2016    History of obesity    Prediabetes 11/21/2016    Prediabetes    Unspecified disorder of nose and nasal sinuses 02/03/2016    Sinus trouble        SURGICAL HISTORY  Past Surgical History:   Procedure Laterality Date    CHOLECYSTECTOMY      DILATION AND CURETTAGE OF UTERUS  11/21/2016    Dilation And Curettage    HIP SURGERY  11/21/2016    Hip Surgery    MYOMECTOMY  2022    OTHER SURGICAL HISTORY  02/07/2016    hip surgery    OTHER SURGICAL HISTORY  2019    Eyelid surgery for ptosis        ANESTHESIA HISTORY  Denies problems with anesthesia in the past such as PONV, prolonged sedation, awareness, dental damage, aspiration, cardiac arrest, difficult intubation, or unexpected hospital admissions. Denies family history of malignant hyperthermia, or pseudocholinesterase deficiency.     SOCIAL HISTORY  Current everyday smoker smokes 1/2 pack/day x 16 years; denies alcohol or recreational drug use.  Patient states she walks her dogs for 30 minutes at a time once to twice a week.  She states she is able to do moderate ADLs such as carrying a laundry basket up a flight of stairs, wash floors, vacuuming, cook.  She denies shortness of breath or chest pain with these activities.  No regular exercise.  METS 4    FAMILY HISTORY  Family History   Problem Relation Name Age of Onset    Hypertension Mother      Bone cancer Father      COPD Father      Asthma Brother      Breast cancer Maternal Grandmother          ALLERGIES  Allergies   Allergen Reactions    Morphine Hives    Oxycodone-Acetaminophen Hives        MEDICATIONS  No current facility-administered medications for this encounter.    Current Outpatient Medications:     albuterol 90 mcg/actuation inhaler, Inhale 1-2 puffs  every 4 hours if needed for wheezing or shortness of breath. EVERY 4-6 HOURS AS NEEDED AND AS DIRECTED., Disp: , Rfl:     carvedilol (Coreg) 25 mg tablet, Take 1 tablet (25 mg) by mouth 2 times a day with meals., Disp: 180 tablet, Rfl: 3    ferrous sulfate 325 (65 Fe) MG tablet, Take 1 tablet (325 mg) by mouth 2 times a day., Disp: , Rfl:     losartan-hydrochlorothiazide (Hyzaar) 100-25 mg tablet, Take 1 tablet by mouth once daily., Disp: , Rfl:     medroxyPROGESTERone (Provera) 10 mg tablet, Take 2 tablets (20 mg) by mouth once daily., Disp: 60 tablet, Rfl: 1    spironolactone (Aldactone) 50 mg tablet, Take 1 tablet (50 mg) by mouth once daily., Disp: 90 tablet, Rfl: 0    amLODIPine (Norvasc) 10 mg tablet, Take 1 tablet (10 mg) by mouth once daily., Disp: 30 tablet, Rfl: 5     REVIEW OF SYSTEMS  Review of Systems   Genitourinary:         Uterine fibroid   All other systems reviewed and are negative.    STOP BANG:  Obstructive Sleep Apnea (TAD): Intermediate Risk  Total Score: 3              Patient snores loudly     Patient has high blood pressure or is being treated for high blood pressure     Patient BMI is greater than 35 kg/m2    Criteria that do not apply:    Patient is often tired    Patient has been observed to stop breathing during sleep    Patient is over 50 years old    Patient's neck circumference is greater than 17 inches (male) or 16 inches (female)    Patient is male            PHYSICAL EXAM  Deferred    AIRWAY EXAM  Deferred    VITALS  No vitals taken for telemedicine visit  BMI Readings from Last 1 Encounters:   06/03/24 42.97 kg/m²      BP Readings from Last 4 Encounters:   06/05/24 145/88   06/08/23 117/77   09/19/22 122/82   06/18/22 (!) 198/133        LABS  Lab Results   Component Value Date    WBC 13.1 (H) 06/05/2024    HGB 9.1 (L) 06/05/2024    HCT 32.0 (L) 06/05/2024    MCV 74 (L) 06/05/2024     06/05/2024      Lab Results   Component Value Date    GLUCOSE 110 (H) 06/05/2024    CALCIUM  "8.5 (L) 06/05/2024     06/05/2024    K 3.0 (L) 06/05/2024    CO2 26 06/05/2024     06/05/2024    BUN 16 06/05/2024    CREATININE 1.36 (H) 06/05/2024      Lab Results   Component Value Date    HGBA1C 4.4 08/29/2023      Lab Results   Component Value Date    CHOL 122 03/23/2021     Lab Results   Component Value Date    HDL 38.8 (A) 03/23/2021     No results found for: \"LDLCALC\"  Lab Results   Component Value Date    TRIG 82 03/23/2021     Active lab orders for CBC, type and screen placed by Dr. Leger.  Patient expressed understanding that lab work was to be completed within 5 days of surgery.    IMAGING  Encounter Date: 06/03/24   ECG 12 lead   Result Value    Ventricular Rate 106    Atrial Rate 106    TX Interval 168    QRS Duration 72    QT Interval 352    QTC Calculation(Bazett) 467    P Axis 62    R Axis 13    T Axis 81    QRS Count 18    Q Onset 220    P Onset 136    P Offset 203    T Offset 396    QTC Fredericia 425    Narrative    Sinus tachycardia  Nonspecific ST and T wave abnormality  Abnormal ECG  When compared with ECG of 18-JUN-2022 12:44,  Previous ECG has undetermined rhythm, needs review  Nonspecific T wave abnormality no longer evident in Inferior leads  See ED provider note for full interpretation and clinical correlation  Confirmed by Lesvia Ornelas ([id number]) on 6/9/2024 9:58:12 PM      Chest x-ray showing cardiomegaly done on 06/05/2024 while patient was at ED for shortness of breath, chest pain, and severe anemia.  TTE ordered for follow-up.  Patient is aware that echocardiogram needs to be completed prior to surgery.    Echocardiogram from 06/20/2022   PHYSICIAN INTERPRETATION:  Left Ventricle: The left ventricular systolic function is normal, with an estimated ejection fraction of 60-65%. There are no regional wall motion abnormalities. The left ventricular cavity size is normal. There is moderate to severe concentric left ventricular hypertrophy. Spectral Doppler shows a " pseudonormal pattern of left ventricular diastolic filling.  Left Atrium: The left atrium is moderately dilated.  Right Ventricle: The right ventricle is normal in size. There is normal right ventricular global systolic function.  Right Atrium: The right atrium is normal in size.  Aortic Valve: The aortic valve was not well visualized. There is no evidence of aortic valve regurgitation. The mean gradient of the aortic valve is 5.0 mmHg.  Mitral Valve: The mitral valve is normal in structure. There is trace to mild mitral valve regurgitation.  Tricuspid Valve: The tricuspid valve is structurally normal. There is mild tricuspid regurgitation. The Doppler estimated RVSP is mildly elevated at 34.4 mmHg.  Pulmonic Valve: The pulmonic valve is structurally normal. There is physiologic pulmonic valve regurgitation.  Pericardium: There is no pericardial effusion noted.  Aorta: The aortic root is normal.  CONCLUSIONS:  1. The left ventricular systolic function is normal with a 60-65% estimated ejection fraction.  2. Spectral Doppler shows a pseudonormal pattern of left ventricular diastolic filling.  3. There is moderate to severe concentric left ventricular hypertrophy.  4. The left atrium is moderately dilated.  5. Mildly elevated RVSP.      ASSESSMENT/PLAN  Submucous uterine fibroid  Uterine biopsy      This note was created in part upon personal review of patient's medical records.  Speech recognition transcription software was used in the creation of this note. Despite proofreading, several typographical errors might be present that might affect the meaning of the content.     Echocardiogram order placed on 06/05/2024 by Dr. Mark Rios needs to be completed prior to surgery to evaluate cardiomegaly noted on chest radiograph. Patient scheduled for Echocardiogram on 09/04/24 at [facility] at 9:00 am did not show up for appointment.

## 2024-08-29 ENCOUNTER — APPOINTMENT (OUTPATIENT)
Dept: OBSTETRICS AND GYNECOLOGY | Facility: CLINIC | Age: 39
End: 2024-08-29

## 2024-09-04 ENCOUNTER — APPOINTMENT (OUTPATIENT)
Dept: OBSTETRICS AND GYNECOLOGY | Facility: CLINIC | Age: 39
End: 2024-09-04

## 2024-09-09 ENCOUNTER — HOSPITAL ENCOUNTER (INPATIENT)
Facility: HOSPITAL | Age: 39
LOS: 1 days | Discharge: HOME | DRG: 742 | End: 2024-09-10
Attending: EMERGENCY MEDICINE | Admitting: INTERNAL MEDICINE
Payer: MEDICARE

## 2024-09-09 ENCOUNTER — ANESTHESIA EVENT (OUTPATIENT)
Dept: OPERATING ROOM | Facility: HOSPITAL | Age: 39
End: 2024-09-09
Payer: MEDICARE

## 2024-09-09 ENCOUNTER — APPOINTMENT (OUTPATIENT)
Dept: RADIOLOGY | Facility: HOSPITAL | Age: 39
DRG: 742 | End: 2024-09-09
Payer: MEDICARE

## 2024-09-09 DIAGNOSIS — D25.0 SUBMUCOUS UTERINE FIBROID: ICD-10-CM

## 2024-09-09 DIAGNOSIS — D64.9 ANEMIA, UNSPECIFIED TYPE: ICD-10-CM

## 2024-09-09 DIAGNOSIS — D25.9 UTERINE LEIOMYOMA, UNSPECIFIED LOCATION: ICD-10-CM

## 2024-09-09 DIAGNOSIS — N93.8 DYSFUNCTIONAL UTERINE BLEEDING: Primary | ICD-10-CM

## 2024-09-09 DIAGNOSIS — N93.9 VAGINAL BLEEDING: ICD-10-CM

## 2024-09-09 LAB
ABO GROUP (TYPE) IN BLOOD: NORMAL
ALBUMIN SERPL BCP-MCNC: 3.4 G/DL (ref 3.4–5)
ALP SERPL-CCNC: 52 U/L (ref 33–110)
ALT SERPL W P-5'-P-CCNC: 10 U/L (ref 7–45)
ANION GAP SERPL CALC-SCNC: 8 MMOL/L (ref 10–20)
ANION GAP SERPL CALC-SCNC: 9 MMOL/L (ref 10–20)
ANTIBODY SCREEN: NORMAL
APTT PPP: 26 SECONDS (ref 27–38)
AST SERPL W P-5'-P-CCNC: 10 U/L (ref 9–39)
ATRIAL RATE: 101 BPM
B-HCG SERPL-ACNC: <2 MIU/ML
BASOPHILS # BLD AUTO: 0.04 X10*3/UL (ref 0–0.1)
BASOPHILS # BLD AUTO: 0.05 X10*3/UL (ref 0–0.1)
BASOPHILS NFR BLD AUTO: 0.5 %
BASOPHILS NFR BLD AUTO: 0.5 %
BILIRUB SERPL-MCNC: 0.2 MG/DL (ref 0–1.2)
BUN SERPL-MCNC: 13 MG/DL (ref 6–23)
BUN SERPL-MCNC: 16 MG/DL (ref 6–23)
CALCIUM SERPL-MCNC: 7.6 MG/DL (ref 8.6–10.3)
CALCIUM SERPL-MCNC: 8.3 MG/DL (ref 8.6–10.3)
CARDIAC TROPONIN I PNL SERPL HS: 20 NG/L (ref 0–13)
CARDIAC TROPONIN I PNL SERPL HS: 21 NG/L (ref 0–13)
CHLORIDE SERPL-SCNC: 109 MMOL/L (ref 98–107)
CHLORIDE SERPL-SCNC: 112 MMOL/L (ref 98–107)
CO2 SERPL-SCNC: 22 MMOL/L (ref 21–32)
CO2 SERPL-SCNC: 27 MMOL/L (ref 21–32)
CREAT SERPL-MCNC: 1.12 MG/DL (ref 0.5–1.05)
CREAT SERPL-MCNC: 1.37 MG/DL (ref 0.5–1.05)
EGFRCR SERPLBLD CKD-EPI 2021: 51 ML/MIN/1.73M*2
EGFRCR SERPLBLD CKD-EPI 2021: 65 ML/MIN/1.73M*2
EOSINOPHIL # BLD AUTO: 0.17 X10*3/UL (ref 0–0.7)
EOSINOPHIL # BLD AUTO: 0.21 X10*3/UL (ref 0–0.7)
EOSINOPHIL NFR BLD AUTO: 2 %
EOSINOPHIL NFR BLD AUTO: 2 %
ERYTHROCYTE [DISTWIDTH] IN BLOOD BY AUTOMATED COUNT: 16.7 % (ref 11.5–14.5)
ERYTHROCYTE [DISTWIDTH] IN BLOOD BY AUTOMATED COUNT: 19.8 % (ref 11.5–14.5)
GLUCOSE SERPL-MCNC: 103 MG/DL (ref 74–99)
GLUCOSE SERPL-MCNC: 90 MG/DL (ref 74–99)
HCG UR QL IA.RAPID: NEGATIVE
HCT VFR BLD AUTO: 19.1 % (ref 36–46)
HCT VFR BLD AUTO: 29.1 % (ref 36–46)
HGB BLD-MCNC: 4.6 G/DL (ref 12–16)
HGB BLD-MCNC: 8.4 G/DL (ref 12–16)
HOLD SPECIMEN: NORMAL
IMM GRANULOCYTES # BLD AUTO: 0.08 X10*3/UL (ref 0–0.7)
IMM GRANULOCYTES # BLD AUTO: 0.25 X10*3/UL (ref 0–0.7)
IMM GRANULOCYTES NFR BLD AUTO: 0.9 % (ref 0–0.9)
IMM GRANULOCYTES NFR BLD AUTO: 2.3 % (ref 0–0.9)
INR PPP: 1.2 (ref 0.9–1.1)
INR PPP: 1.2 (ref 0.9–1.1)
LYMPHOCYTES # BLD AUTO: 1.71 X10*3/UL (ref 1.2–4.8)
LYMPHOCYTES # BLD AUTO: 1.86 X10*3/UL (ref 1.2–4.8)
LYMPHOCYTES NFR BLD AUTO: 16.1 %
LYMPHOCYTES NFR BLD AUTO: 22 %
MAGNESIUM SERPL-MCNC: 2.3 MG/DL (ref 1.6–2.4)
MAGNESIUM SERPL-MCNC: 2.5 MG/DL (ref 1.6–2.4)
MCH RBC QN AUTO: 21.2 PG (ref 26–34)
MCH RBC QN AUTO: 24.9 PG (ref 26–34)
MCHC RBC AUTO-ENTMCNC: 24.1 G/DL (ref 32–36)
MCHC RBC AUTO-ENTMCNC: 28.9 G/DL (ref 32–36)
MCV RBC AUTO: 86 FL (ref 80–100)
MCV RBC AUTO: 88 FL (ref 80–100)
MONOCYTES # BLD AUTO: 0.5 X10*3/UL (ref 0.1–1)
MONOCYTES # BLD AUTO: 0.57 X10*3/UL (ref 0.1–1)
MONOCYTES NFR BLD AUTO: 5.4 %
MONOCYTES NFR BLD AUTO: 5.9 %
NEUTROPHILS # BLD AUTO: 5.81 X10*3/UL (ref 1.2–7.7)
NEUTROPHILS # BLD AUTO: 7.86 X10*3/UL (ref 1.2–7.7)
NEUTROPHILS NFR BLD AUTO: 68.7 %
NEUTROPHILS NFR BLD AUTO: 73.7 %
NRBC BLD-RTO: 0.8 /100 WBCS (ref 0–0)
NRBC BLD-RTO: 1.6 /100 WBCS (ref 0–0)
P AXIS: 59 DEGREES
P OFFSET: 201 MS
P ONSET: 143 MS
PLATELET # BLD AUTO: 355 X10*3/UL (ref 150–450)
PLATELET # BLD AUTO: 501 X10*3/UL (ref 150–450)
POTASSIUM SERPL-SCNC: 3.4 MMOL/L (ref 3.5–5.3)
POTASSIUM SERPL-SCNC: 4.4 MMOL/L (ref 3.5–5.3)
PR INTERVAL: 154 MS
PROT SERPL-MCNC: 6 G/DL (ref 6.4–8.2)
PROTHROMBIN TIME: 14 SECONDS (ref 9.8–12.8)
PROTHROMBIN TIME: 14 SECONDS (ref 9.8–12.8)
Q ONSET: 220 MS
QRS COUNT: 17 BEATS
QRS DURATION: 80 MS
QT INTERVAL: 354 MS
QTC CALCULATION(BAZETT): 459 MS
QTC FREDERICIA: 421 MS
R AXIS: 73 DEGREES
RBC # BLD AUTO: 2.17 X10*6/UL (ref 4–5.2)
RBC # BLD AUTO: 3.38 X10*6/UL (ref 4–5.2)
RH FACTOR (ANTIGEN D): NORMAL
SODIUM SERPL-SCNC: 139 MMOL/L (ref 136–145)
SODIUM SERPL-SCNC: 141 MMOL/L (ref 136–145)
T AXIS: -23 DEGREES
T OFFSET: 397 MS
VENTRICULAR RATE: 101 BPM
WBC # BLD AUTO: 10.7 X10*3/UL (ref 4.4–11.3)
WBC # BLD AUTO: 8.5 X10*3/UL (ref 4.4–11.3)

## 2024-09-09 PROCEDURE — 36430 TRANSFUSION BLD/BLD COMPNT: CPT

## 2024-09-09 PROCEDURE — 2500000002 HC RX 250 W HCPCS SELF ADMINISTERED DRUGS (ALT 637 FOR MEDICARE OP, ALT 636 FOR OP/ED): Performed by: INTERNAL MEDICINE

## 2024-09-09 PROCEDURE — 96375 TX/PRO/DX INJ NEW DRUG ADDON: CPT

## 2024-09-09 PROCEDURE — P9040 RBC LEUKOREDUCED IRRADIATED: HCPCS

## 2024-09-09 PROCEDURE — 2500000001 HC RX 250 WO HCPCS SELF ADMINISTERED DRUGS (ALT 637 FOR MEDICARE OP): Performed by: INTERNAL MEDICINE

## 2024-09-09 PROCEDURE — 86901 BLOOD TYPING SEROLOGIC RH(D): CPT | Performed by: SURGERY

## 2024-09-09 PROCEDURE — 85025 COMPLETE CBC W/AUTO DIFF WBC: CPT | Performed by: SURGERY

## 2024-09-09 PROCEDURE — 83735 ASSAY OF MAGNESIUM: CPT | Performed by: SURGERY

## 2024-09-09 PROCEDURE — 80053 COMPREHEN METABOLIC PANEL: CPT | Performed by: SURGERY

## 2024-09-09 PROCEDURE — P9016 RBC LEUKOCYTES REDUCED: HCPCS

## 2024-09-09 PROCEDURE — 86923 COMPATIBILITY TEST ELECTRIC: CPT

## 2024-09-09 PROCEDURE — 36415 COLL VENOUS BLD VENIPUNCTURE: CPT | Performed by: SURGERY

## 2024-09-09 PROCEDURE — 85610 PROTHROMBIN TIME: CPT | Performed by: SURGERY

## 2024-09-09 PROCEDURE — 83735 ASSAY OF MAGNESIUM: CPT | Performed by: HOSPITALIST

## 2024-09-09 PROCEDURE — 80048 BASIC METABOLIC PNL TOTAL CA: CPT | Mod: CCI | Performed by: HOSPITALIST

## 2024-09-09 PROCEDURE — 84484 ASSAY OF TROPONIN QUANT: CPT | Performed by: EMERGENCY MEDICINE

## 2024-09-09 PROCEDURE — 2500000004 HC RX 250 GENERAL PHARMACY W/ HCPCS (ALT 636 FOR OP/ED): Performed by: HOSPITALIST

## 2024-09-09 PROCEDURE — 85025 COMPLETE CBC W/AUTO DIFF WBC: CPT | Performed by: HOSPITALIST

## 2024-09-09 PROCEDURE — 96365 THER/PROPH/DIAG IV INF INIT: CPT

## 2024-09-09 PROCEDURE — 2500000004 HC RX 250 GENERAL PHARMACY W/ HCPCS (ALT 636 FOR OP/ED)

## 2024-09-09 PROCEDURE — 36415 COLL VENOUS BLD VENIPUNCTURE: CPT | Performed by: EMERGENCY MEDICINE

## 2024-09-09 PROCEDURE — 2500000004 HC RX 250 GENERAL PHARMACY W/ HCPCS (ALT 636 FOR OP/ED): Performed by: INTERNAL MEDICINE

## 2024-09-09 PROCEDURE — 85610 PROTHROMBIN TIME: CPT | Performed by: EMERGENCY MEDICINE

## 2024-09-09 PROCEDURE — 81025 URINE PREGNANCY TEST: CPT | Performed by: EMERGENCY MEDICINE

## 2024-09-09 PROCEDURE — 1100000001 HC PRIVATE ROOM DAILY

## 2024-09-09 PROCEDURE — 99285 EMERGENCY DEPT VISIT HI MDM: CPT | Mod: 25

## 2024-09-09 PROCEDURE — 99222 1ST HOSP IP/OBS MODERATE 55: CPT | Performed by: INTERNAL MEDICINE

## 2024-09-09 PROCEDURE — 2500000002 HC RX 250 W HCPCS SELF ADMINISTERED DRUGS (ALT 637 FOR MEDICARE OP, ALT 636 FOR OP/ED): Performed by: EMERGENCY MEDICINE

## 2024-09-09 PROCEDURE — 99221 1ST HOSP IP/OBS SF/LOW 40: CPT | Performed by: OBSTETRICS & GYNECOLOGY

## 2024-09-09 PROCEDURE — 2500000004 HC RX 250 GENERAL PHARMACY W/ HCPCS (ALT 636 FOR OP/ED): Performed by: EMERGENCY MEDICINE

## 2024-09-09 PROCEDURE — 84702 CHORIONIC GONADOTROPIN TEST: CPT | Performed by: EMERGENCY MEDICINE

## 2024-09-09 PROCEDURE — 99291 CRITICAL CARE FIRST HOUR: CPT | Performed by: EMERGENCY MEDICINE

## 2024-09-09 RX ORDER — MEDROXYPROGESTERONE ACETATE 10 MG/1
20 TABLET ORAL DAILY
Status: DISCONTINUED | OUTPATIENT
Start: 2024-09-09 | End: 2024-09-10

## 2024-09-09 RX ORDER — SODIUM CHLORIDE, SODIUM LACTATE, POTASSIUM CHLORIDE, CALCIUM CHLORIDE 600; 310; 30; 20 MG/100ML; MG/100ML; MG/100ML; MG/100ML
100 INJECTION, SOLUTION INTRAVENOUS CONTINUOUS
Status: CANCELLED | OUTPATIENT
Start: 2024-09-09

## 2024-09-09 RX ORDER — FENTANYL CITRATE 50 UG/ML
50 INJECTION, SOLUTION INTRAMUSCULAR; INTRAVENOUS EVERY 5 MIN PRN
Status: CANCELLED | OUTPATIENT
Start: 2024-09-09

## 2024-09-09 RX ORDER — TRANEXAMIC ACID 650 MG/1
1300 TABLET ORAL 3 TIMES DAILY
Status: DISCONTINUED | OUTPATIENT
Start: 2024-09-09 | End: 2024-09-10

## 2024-09-09 RX ORDER — ALBUTEROL SULFATE 0.83 MG/ML
2.5 SOLUTION RESPIRATORY (INHALATION) EVERY 2 HOUR PRN
Status: DISCONTINUED | OUTPATIENT
Start: 2024-09-09 | End: 2024-09-10 | Stop reason: HOSPADM

## 2024-09-09 RX ORDER — SPIRONOLACTONE 25 MG/1
50 TABLET ORAL DAILY
Status: DISCONTINUED | OUTPATIENT
Start: 2024-09-09 | End: 2024-09-10 | Stop reason: HOSPADM

## 2024-09-09 RX ORDER — POTASSIUM CHLORIDE 20 MEQ/1
40 TABLET, EXTENDED RELEASE ORAL ONCE
Status: COMPLETED | OUTPATIENT
Start: 2024-09-09 | End: 2024-09-09

## 2024-09-09 RX ORDER — ONDANSETRON HYDROCHLORIDE 2 MG/ML
4 INJECTION, SOLUTION INTRAVENOUS ONCE AS NEEDED
Status: CANCELLED | OUTPATIENT
Start: 2024-09-09

## 2024-09-09 RX ORDER — ACETAMINOPHEN 650 MG/1
650 SUPPOSITORY RECTAL EVERY 4 HOURS PRN
Status: DISCONTINUED | OUTPATIENT
Start: 2024-09-09 | End: 2024-09-10 | Stop reason: HOSPADM

## 2024-09-09 RX ORDER — SODIUM CHLORIDE 9 MG/ML
100 INJECTION, SOLUTION INTRAVENOUS CONTINUOUS
Status: DISCONTINUED | OUTPATIENT
Start: 2024-09-09 | End: 2024-09-09

## 2024-09-09 RX ORDER — ONDANSETRON 4 MG/1
4 TABLET, FILM COATED ORAL EVERY 8 HOURS PRN
Status: DISCONTINUED | OUTPATIENT
Start: 2024-09-09 | End: 2024-09-10 | Stop reason: HOSPADM

## 2024-09-09 RX ORDER — TRANEXAMIC ACID 10 MG/ML
1000 INJECTION, SOLUTION INTRAVENOUS ONCE
Status: COMPLETED | OUTPATIENT
Start: 2024-09-09 | End: 2024-09-09

## 2024-09-09 RX ORDER — FENTANYL CITRATE 50 UG/ML
25 INJECTION, SOLUTION INTRAMUSCULAR; INTRAVENOUS EVERY 5 MIN PRN
Status: CANCELLED | OUTPATIENT
Start: 2024-09-09

## 2024-09-09 RX ORDER — HYDROMORPHONE HYDROCHLORIDE 1 MG/ML
0.5 INJECTION, SOLUTION INTRAMUSCULAR; INTRAVENOUS; SUBCUTANEOUS ONCE
Status: COMPLETED | OUTPATIENT
Start: 2024-09-09 | End: 2024-09-09

## 2024-09-09 RX ORDER — CARVEDILOL 25 MG/1
25 TABLET ORAL
Status: DISCONTINUED | OUTPATIENT
Start: 2024-09-09 | End: 2024-09-10 | Stop reason: HOSPADM

## 2024-09-09 RX ORDER — HYDROMORPHONE HYDROCHLORIDE 0.2 MG/ML
0.2 INJECTION INTRAMUSCULAR; INTRAVENOUS; SUBCUTANEOUS
Status: DISCONTINUED | OUTPATIENT
Start: 2024-09-09 | End: 2024-09-10 | Stop reason: HOSPADM

## 2024-09-09 RX ORDER — PANTOPRAZOLE SODIUM 40 MG/1
40 TABLET, DELAYED RELEASE ORAL
Status: DISCONTINUED | OUTPATIENT
Start: 2024-09-09 | End: 2024-09-10 | Stop reason: HOSPADM

## 2024-09-09 RX ORDER — ALBUTEROL SULFATE 0.83 MG/ML
2.5 SOLUTION RESPIRATORY (INHALATION) EVERY 4 HOURS PRN
Status: DISCONTINUED | OUTPATIENT
Start: 2024-09-09 | End: 2024-09-09

## 2024-09-09 RX ORDER — TRANEXAMIC ACID 650 MG/1
1300 TABLET ORAL 3 TIMES DAILY
Status: DISCONTINUED | OUTPATIENT
Start: 2024-09-09 | End: 2024-09-09 | Stop reason: SDUPTHER

## 2024-09-09 RX ORDER — ACETAMINOPHEN 160 MG/5ML
650 SUSPENSION ORAL EVERY 4 HOURS PRN
Status: DISCONTINUED | OUTPATIENT
Start: 2024-09-09 | End: 2024-09-10 | Stop reason: HOSPADM

## 2024-09-09 RX ORDER — LIDOCAINE HYDROCHLORIDE 10 MG/ML
0.1 INJECTION, SOLUTION EPIDURAL; INFILTRATION; INTRACAUDAL; PERINEURAL ONCE
Status: CANCELLED | OUTPATIENT
Start: 2024-09-09 | End: 2024-09-09

## 2024-09-09 RX ORDER — LABETALOL HYDROCHLORIDE 5 MG/ML
5 INJECTION, SOLUTION INTRAVENOUS ONCE AS NEEDED
Status: CANCELLED | OUTPATIENT
Start: 2024-09-09

## 2024-09-09 RX ORDER — ALBUTEROL SULFATE 90 UG/1
1-2 INHALANT RESPIRATORY (INHALATION) EVERY 4 HOURS PRN
Status: DISCONTINUED | OUTPATIENT
Start: 2024-09-09 | End: 2024-09-09 | Stop reason: CLARIF

## 2024-09-09 RX ORDER — PANTOPRAZOLE SODIUM 40 MG/10ML
40 INJECTION, POWDER, LYOPHILIZED, FOR SOLUTION INTRAVENOUS
Status: DISCONTINUED | OUTPATIENT
Start: 2024-09-09 | End: 2024-09-10 | Stop reason: HOSPADM

## 2024-09-09 RX ORDER — HYDRALAZINE HYDROCHLORIDE 20 MG/ML
10 INJECTION INTRAMUSCULAR; INTRAVENOUS EVERY 8 HOURS PRN
Status: DISCONTINUED | OUTPATIENT
Start: 2024-09-09 | End: 2024-09-10 | Stop reason: HOSPADM

## 2024-09-09 RX ORDER — FERROUS SULFATE 325(65) MG
1 TABLET ORAL 2 TIMES DAILY
Status: DISCONTINUED | OUTPATIENT
Start: 2024-09-09 | End: 2024-09-10 | Stop reason: HOSPADM

## 2024-09-09 RX ORDER — ONDANSETRON HYDROCHLORIDE 2 MG/ML
4 INJECTION, SOLUTION INTRAVENOUS EVERY 8 HOURS PRN
Status: DISCONTINUED | OUTPATIENT
Start: 2024-09-09 | End: 2024-09-10 | Stop reason: HOSPADM

## 2024-09-09 RX ORDER — AMLODIPINE BESYLATE 10 MG/1
10 TABLET ORAL DAILY
Status: DISCONTINUED | OUTPATIENT
Start: 2024-09-09 | End: 2024-09-10 | Stop reason: HOSPADM

## 2024-09-09 RX ORDER — ACETAMINOPHEN 325 MG/1
650 TABLET ORAL EVERY 4 HOURS PRN
Status: DISCONTINUED | OUTPATIENT
Start: 2024-09-09 | End: 2024-09-10 | Stop reason: HOSPADM

## 2024-09-09 SDOH — SOCIAL STABILITY: SOCIAL INSECURITY: ARE YOU OR HAVE YOU BEEN THREATENED OR ABUSED PHYSICALLY, EMOTIONALLY, OR SEXUALLY BY ANYONE?: NO

## 2024-09-09 SDOH — SOCIAL STABILITY: SOCIAL INSECURITY: ARE THERE ANY APPARENT SIGNS OF INJURIES/BEHAVIORS THAT COULD BE RELATED TO ABUSE/NEGLECT?: NO

## 2024-09-09 SDOH — SOCIAL STABILITY: SOCIAL INSECURITY: WERE YOU ABLE TO COMPLETE ALL THE BEHAVIORAL HEALTH SCREENINGS?: YES

## 2024-09-09 SDOH — SOCIAL STABILITY: SOCIAL INSECURITY: HAVE YOU HAD ANY THOUGHTS OF HARMING ANYONE ELSE?: NO

## 2024-09-09 SDOH — SOCIAL STABILITY: SOCIAL INSECURITY: HAVE YOU HAD THOUGHTS OF HARMING ANYONE ELSE?: NO

## 2024-09-09 SDOH — SOCIAL STABILITY: SOCIAL INSECURITY: DO YOU FEEL UNSAFE GOING BACK TO THE PLACE WHERE YOU ARE LIVING?: NO

## 2024-09-09 SDOH — SOCIAL STABILITY: SOCIAL INSECURITY: DO YOU FEEL ANYONE HAS EXPLOITED OR TAKEN ADVANTAGE OF YOU FINANCIALLY OR OF YOUR PERSONAL PROPERTY?: NO

## 2024-09-09 SDOH — SOCIAL STABILITY: SOCIAL INSECURITY: DOES ANYONE TRY TO KEEP YOU FROM HAVING/CONTACTING OTHER FRIENDS OR DOING THINGS OUTSIDE YOUR HOME?: NO

## 2024-09-09 SDOH — SOCIAL STABILITY: SOCIAL INSECURITY: ABUSE: ADULT

## 2024-09-09 SDOH — SOCIAL STABILITY: SOCIAL INSECURITY: HAS ANYONE EVER THREATENED TO HURT YOUR FAMILY OR YOUR PETS?: NO

## 2024-09-09 ASSESSMENT — PAIN SCALES - GENERAL
PAINLEVEL_OUTOF10: 0 - NO PAIN
PAINLEVEL_OUTOF10: 0 - NO PAIN
PAINLEVEL_OUTOF10: 8
PAINLEVEL_OUTOF10: 0 - NO PAIN
PAINLEVEL_OUTOF10: 8
PAINLEVEL_OUTOF10: 0 - NO PAIN
PAINLEVEL_OUTOF10: 8

## 2024-09-09 ASSESSMENT — ACTIVITIES OF DAILY LIVING (ADL)
LACK_OF_TRANSPORTATION: NO
HEARING - LEFT EAR: FUNCTIONAL
HEARING - RIGHT EAR: FUNCTIONAL
WALKS IN HOME: INDEPENDENT
JUDGMENT_ADEQUATE_SAFELY_COMPLETE_DAILY_ACTIVITIES: YES
PATIENT'S MEMORY ADEQUATE TO SAFELY COMPLETE DAILY ACTIVITIES?: YES
ADEQUATE_TO_COMPLETE_ADL: YES
FEEDING YOURSELF: INDEPENDENT
GROOMING: INDEPENDENT
BATHING: INDEPENDENT
TOILETING: INDEPENDENT
DRESSING YOURSELF: INDEPENDENT

## 2024-09-09 ASSESSMENT — COGNITIVE AND FUNCTIONAL STATUS - GENERAL
PATIENT BASELINE BEDBOUND: NO
DAILY ACTIVITIY SCORE: 24
MOBILITY SCORE: 24
MOBILITY SCORE: 24
DAILY ACTIVITIY SCORE: 24

## 2024-09-09 ASSESSMENT — PAIN - FUNCTIONAL ASSESSMENT
PAIN_FUNCTIONAL_ASSESSMENT: 0-10
PAIN_FUNCTIONAL_ASSESSMENT: 0-10

## 2024-09-09 ASSESSMENT — LIFESTYLE VARIABLES
HOW OFTEN DO YOU HAVE 6 OR MORE DRINKS ON ONE OCCASION: NEVER
HOW OFTEN DO YOU HAVE A DRINK CONTAINING ALCOHOL: NEVER
AUDIT-C TOTAL SCORE: 0
HOW MANY STANDARD DRINKS CONTAINING ALCOHOL DO YOU HAVE ON A TYPICAL DAY: PATIENT DOES NOT DRINK
SKIP TO QUESTIONS 9-10: 1
PRESCIPTION_ABUSE_PAST_12_MONTHS: NO
SUBSTANCE_ABUSE_PAST_12_MONTHS: NO
AUDIT-C TOTAL SCORE: 0

## 2024-09-09 ASSESSMENT — PAIN DESCRIPTION - DESCRIPTORS
DESCRIPTORS: SHARP
DESCRIPTORS: SHARP

## 2024-09-09 ASSESSMENT — PAIN DESCRIPTION - LOCATION
LOCATION: ABDOMEN

## 2024-09-09 ASSESSMENT — ENCOUNTER SYMPTOMS
SHORTNESS OF BREATH: 1
EYES NEGATIVE: 1
ALLERGIC/IMMUNOLOGIC NEGATIVE: 1
ABDOMINAL PAIN: 1
HEMATOLOGIC/LYMPHATIC NEGATIVE: 1
ACTIVITY CHANGE: 1
PSYCHIATRIC NEGATIVE: 1
NEUROLOGICAL NEGATIVE: 1
FATIGUE: 1
ENDOCRINE NEGATIVE: 1
MUSCULOSKELETAL NEGATIVE: 1

## 2024-09-09 ASSESSMENT — PAIN DESCRIPTION - PAIN TYPE: TYPE: ACUTE PAIN

## 2024-09-09 ASSESSMENT — PAIN DESCRIPTION - ONSET: ONSET: GRADUAL

## 2024-09-09 ASSESSMENT — PAIN DESCRIPTION - PROGRESSION: CLINICAL_PROGRESSION: GRADUALLY WORSENING

## 2024-09-09 ASSESSMENT — PAIN DESCRIPTION - ORIENTATION: ORIENTATION: LOWER

## 2024-09-09 ASSESSMENT — COLUMBIA-SUICIDE SEVERITY RATING SCALE - C-SSRS
1. IN THE PAST MONTH, HAVE YOU WISHED YOU WERE DEAD OR WISHED YOU COULD GO TO SLEEP AND NOT WAKE UP?: NO
2. HAVE YOU ACTUALLY HAD ANY THOUGHTS OF KILLING YOURSELF?: NO
6. HAVE YOU EVER DONE ANYTHING, STARTED TO DO ANYTHING, OR PREPARED TO DO ANYTHING TO END YOUR LIFE?: NO

## 2024-09-09 ASSESSMENT — PAIN DESCRIPTION - DIRECTION: RADIATING_TOWARDS: CHEST

## 2024-09-09 ASSESSMENT — PAIN DESCRIPTION - FREQUENCY: FREQUENCY: CONSTANT/CONTINUOUS

## 2024-09-09 NOTE — PROGRESS NOTES
Home with  & children     09/09/24 0707   Current Planned Discharge Disposition   Current Planned Discharge Disposition Home

## 2024-09-09 NOTE — PROGRESS NOTES
Transitional Care Coordination Progress Note:  Plan per Medical/Surgical team: treatment of vaginal bleed & anemia with GYN consult for surgery tomorrow on fibroids  Status: Inpatient   Payor source: [facility]   Discharge disposition: Home with  & children  Potential Barriers: H/H 4.6/19.1  Episode of VIB- rapid blood transfusion given   ADOD: 9/11/2024  DENNIS Razo RN, BSN Transitional Care Coordinator ED# [phone]      09/09/24 0707   Discharge Planning   Living Arrangements Spouse/significant other;Children   Support Systems Spouse/significant other   Assistance Needed GYN work up   Type of Residence Private residence   Number of Stairs to Enter Residence 5   Number of Stairs Within Residence 15   Home or Post Acute Services None   Expected Discharge Disposition Home   Does the patient need discharge transport arranged? No   Financial Resource Strain   How hard is it for you to pay for the very basics like food, housing, medical care, and heating? Not hard   Housing Stability   In the last 12 months, was there a time when you were not able to pay the mortgage or rent on time? N   In the past 12 months, how many times have you moved where you were living? 1   At any time in the past 12 months, were you homeless or living in a shelter (including now)? N   Transportation Needs   In the past 12 months, has lack of transportation kept you from medical appointments or from getting medications? no   In the past 12 months, has lack of transportation kept you from meetings, work, or from getting things needed for daily living? No

## 2024-09-09 NOTE — ED NOTES
MD and RN at bedside to perform pelvic exam. Pt passed an extremely large amount of tissue and clots. MD aware and gynecology notified. Tissue placed in white hat specimen collector at bedside     Jessica Varela RN  09/09/24 0684

## 2024-09-09 NOTE — ED NOTES
Per MD, first 2 units of PRBCs to run as quick as possible. 2 units running as fast as possible. Patient tolerating well     Jessica Varela RN  09/09/24 0410

## 2024-09-09 NOTE — H&P (VIEW-ONLY)
Reason For Consult  Vaginal bleeding prolapsed fibroid severe anemia    History Of Present Illness  susi Archibald is a 38 y.o. female who presents for New Patient Visit (New patient visit/ED follow up/fibroids no pap on file).  HPI  Patient is a 38-year-old female  1 para 0 previously known to the practice seen in consultation at the hospital for heavy bleeding.  Known intracavitary fibroid.  Bleeding was controlled on oral Provera 20 mg daily  Now noticed a mass in her vagina when she was coughing while sitting on the toilet.     Bleeding is under decent control at this time.  No significant pain.      Past Medical History  She has a past medical history of Fibroid, Hypertension, Personal history of other endocrine, nutritional and metabolic disease (2016), Prediabetes (2016), and Unspecified disorder of nose and nasal sinuses (2016).    Surgical History  She has a past surgical history that includes Other surgical history (2016); Dilation and curettage of uterus (2016); Hip surgery (2016); Cholecystectomy; Myomectomy (); and Other surgical history ().     Social History  She reports that she has been smoking cigarettes. She has never used smokeless tobacco. She reports that she does not currently use alcohol. She reports that she does not use drugs.    Family History  Family History   Problem Relation Name Age of Onset    Hypertension Mother      Bone cancer Father      COPD Father      Asthma Brother      Breast cancer Maternal Grandmother          Allergies  Codeine, Morphine, and Oxycodone-acetaminophen    Review of Systems  Abnormal vaginal bleeding  Pelvic pain     Physical Exam  Thyroid: No thyroid megaly    Cardiovascular: Regular rate and rhythm    Lungs: Clear to auscultation    Abdomen: Soft nontender bowel sounds positive no masses palpated    Extremities nontender no edema    Pelvic exam: External genitalia Bartholin's urethra and Pershing's are  normal.  Vaginal exam shows no lesions or discharge.  Pelvic bimanual exam reveals prolapsed mass within the vagina       last Recorded Vitals  Blood pressure (!) 188/90, pulse 80, temperature 36.7 °C (98.1 °F), resp. rate 18, height 1.524 m (5'), weight 96.2 kg (212 lb), SpO2 94%.    Relevant Results  Posttransfusion hemoglobin 8.4 recheck in the a.m.     Assessment/Plan   Prolapsed fibroid  Menorrhagia  Severe anemia    Proceed with D&C hysteroscopy placement of IUD scheduled for tomorrow    I spent 30 minutes in the professional and overall care of this patient.      Mukul Leger MD

## 2024-09-09 NOTE — PROGRESS NOTES
09/09/24 0707   [facility] Disability Status   Are you deaf or do you have serious difficulty hearing? N   Are you blind or do you have serious difficulty seeing, even when wearing glasses? N   Because of a physical, mental, or emotional condition, do you have serious difficulty concentrating, remembering, or making decisions? (5 years old or older) N   Do you have serious difficulty walking or climbing stairs? N   Do you have serious difficulty dressing or bathing? N   Because of a physical, mental, or emotional condition, do you have serious difficulty doing errands alone such as visiting the doctor? N

## 2024-09-09 NOTE — ED TRIAGE NOTES
Pt came in for having vaginal bleeding with fibroids, Pt stated that she is anemic and is having ABD pain with CP.

## 2024-09-09 NOTE — ED NOTES
MD notified of Vfib noted on heart monitor. MD ordered defib pads to be applied and 2 units of blood to be given immediately as fast as possible. Blood released      Jessica Varela RN  09/09/24 1354

## 2024-09-09 NOTE — CONSULTS
Reason For Consult  Vaginal bleeding prolapsed fibroid severe anemia    History Of Present Illness  susi Archibald is a 38 y.o. female who presents for New Patient Visit (New patient visit/ED follow up/fibroids no pap on file).  HPI  Patient is a 38-year-old female  1 para 0 previously known to the practice seen in consultation at the hospital for heavy bleeding.  Known intracavitary fibroid.  Bleeding was controlled on oral Provera 20 mg daily  Now noticed a mass in her vagina when she was coughing while sitting on the toilet.     Bleeding is under decent control at this time.  No significant pain.      Past Medical History  She has a past medical history of Fibroid, Hypertension, Personal history of other endocrine, nutritional and metabolic disease (2016), Prediabetes (2016), and Unspecified disorder of nose and nasal sinuses (2016).    Surgical History  She has a past surgical history that includes Other surgical history (2016); Dilation and curettage of uterus (2016); Hip surgery (2016); Cholecystectomy; Myomectomy (); and Other surgical history ().     Social History  She reports that she has been smoking cigarettes. She has never used smokeless tobacco. She reports that she does not currently use alcohol. She reports that she does not use drugs.    Family History  Family History   Problem Relation Name Age of Onset    Hypertension Mother      Bone cancer Father      COPD Father      Asthma Brother      Breast cancer Maternal Grandmother          Allergies  Codeine, Morphine, and Oxycodone-acetaminophen    Review of Systems  Abnormal vaginal bleeding  Pelvic pain     Physical Exam  Thyroid: No thyroid megaly    Cardiovascular: Regular rate and rhythm    Lungs: Clear to auscultation    Abdomen: Soft nontender bowel sounds positive no masses palpated    Extremities nontender no edema    Pelvic exam: External genitalia Bartholin's urethra and [place]'s are  normal.  Vaginal exam shows no lesions or discharge.  Pelvic bimanual exam reveals prolapsed mass within the vagina       last Recorded Vitals  Blood pressure (!) 188/90, pulse 80, temperature 36.7 °C (98.1 °F), resp. rate 18, height 1.524 m (5'), weight 96.2 kg (212 lb), SpO2 94%.    Relevant Results  Posttransfusion hemoglobin 8.4 recheck in the a.m.     Assessment/Plan   Prolapsed fibroid  Menorrhagia  Severe anemia    Proceed with D&C hysteroscopy placement of IUD scheduled for tomorrow    I spent 30 minutes in the professional and overall care of this patient.      Mukul Leger MD

## 2024-09-09 NOTE — PROGRESS NOTES
Pharmacy Medication History Review   Spoke to the patient, still takes everything listed, even though pharmacy don't match. No changes    Gretel Archibald is a 38 y.o. female admitted for Dysfunctional uterine bleeding. Pharmacy reviewed the patient's [id number] medications and allergies for accuracy.    The list below reflectives the updated PTA list. Please review each medication in order reconciliation for additional clarification and justification.     Prior to Admission Medications   Prescriptions Last Dose Informant   albuterol 90 mcg/actuation inhaler     Sig: Inhale 1-2 puffs every 4 hours if needed for wheezing or shortness of breath. EVERY 4-6 HOURS AS NEEDED AND AS DIRECTED.   amLODIPine (Norvasc) 10 mg tablet 9/8/2024    Sig: Take 1 tablet (10 mg) by mouth once daily.   Patient taking differently: Take 1 tablet (10 mg) by mouth once daily. Pt says she takes this   carvedilol (Coreg) 25 mg tablet 9/8/2024 at am    Sig: Take 1 tablet (25 mg) by mouth 2 times a day with meals.   ferrous sulfate 325 (65 Fe) MG tablet 9/8/2024 at am    Sig: Take 1 tablet (325 mg) by mouth 2 times a day.   losartan-hydrochlorothiazide (Hyzaar) 100-25 mg tablet 9/8/2024 at am    Sig: Take 1 tablet by mouth once daily.   medroxyPROGESTERone (Provera) 10 mg tablet 9/8/2024 at am    Sig: Take 2 tablets (20 mg) by mouth once daily.   spironolactone (Aldactone) 50 mg tablet 9/8/2024 at am    Sig: Take 1 tablet (50 mg) by mouth once daily.      Facility-Administered Medications: None       The list below reflectives the updated allergy list. Please review each documented allergy for additional clarification and justification.  Allergies  Reviewed by Chanel Muse, EMT on 9/9/2024        Severity Reactions Comments    Codeine Medium Hives     Morphine Medium Hives     Oxycodone-acetaminophen Medium Hives             Below are additional concerns with the patient's PTA list.      Suzanna Gudino

## 2024-09-09 NOTE — H&P
History Of Present Illness  Gretel Archibald is a 38 y.o. female with a past medical history of uterine fibroids presents to [facility] ER with heavy uterine blood loss with symptomatic anemia.  Patient states she has had continued uterine bleeding over the past several months.  Over the past 2 days the bleeding has been worse.  She endorses that she passes large clots and uses about 3 heavy pads a day.  Sometimes she goes through a pad 30 minutes.  She has had some transient discomfort in the midsternal area that currently is resolved.  Will get shortness of breath with exertion at times but is not short of breath now.  She feels fatigued and weak.  She does have lower abdominal cramping associated with the bleeding.  Her hemoglobin is currently 4.6 and she is getting 4 units of packed RBCs.  She is scheduled for gynecological procedure tomorrow     Past Medical History  Past Medical History:   Diagnosis Date    Fibroid     Hypertension     Personal history of other endocrine, nutritional and metabolic disease 11/21/2016    History of obesity    Prediabetes 11/21/2016    Prediabetes    Unspecified disorder of nose and nasal sinuses 02/03/2016    Sinus trouble        Surgical History  Past Surgical History:   Procedure Laterality Date    CHOLECYSTECTOMY      DILATION AND CURETTAGE OF UTERUS  11/21/2016    Dilation And Curettage    HIP SURGERY  11/21/2016    Hip Surgery    MYOMECTOMY  2022    OTHER SURGICAL HISTORY  02/07/2016    hip surgery    OTHER SURGICAL HISTORY  2019    Eyelid surgery for ptosis         Social History  She reports that she has been smoking cigarettes. She has never used smokeless tobacco. She reports that she does not currently use alcohol. She reports that she does not use drugs.    Family History  Family History   Problem Relation Name Age of Onset    Hypertension Mother      Bone cancer Father      COPD Father      Asthma Brother      Breast cancer Maternal Grandmother           Allergies  Codeine, Morphine, and Oxycodone-acetaminophen    Review of Systems   Constitutional:  Positive for activity change and fatigue.   HENT: Negative.     Eyes: Negative.    Respiratory:  Positive for shortness of breath.    Cardiovascular:  Positive for chest pain.   Gastrointestinal:  Positive for abdominal pain.   Endocrine: Negative.    Genitourinary:  Positive for menstrual problem and vaginal bleeding.   Musculoskeletal: Negative.    Skin: Negative.    Allergic/Immunologic: Negative.    Neurological: Negative.    Hematological: Negative.    Psychiatric/Behavioral: Negative.     All other systems reviewed and are negative.       Physical Exam  Vitals and nursing note reviewed.   Constitutional:       Appearance: Normal appearance. She is obese.   HENT:      Head: Normocephalic.      Right Ear: External ear normal.      Left Ear: External ear normal.      Nose: Nose normal.      Mouth/Throat:      Mouth: Mucous membranes are dry.      Pharynx: Oropharynx is clear.   Eyes:      Extraocular Movements: Extraocular movements intact.      Conjunctiva/sclera: Conjunctivae normal.      Pupils: Pupils are equal, round, and reactive to light.   Cardiovascular:      Rate and Rhythm: Normal rate and regular rhythm.   Pulmonary:      Effort: Pulmonary effort is normal.      Breath sounds: Normal breath sounds.   Abdominal:      General: Abdomen is flat. Bowel sounds are normal.      Palpations: Abdomen is soft.   Musculoskeletal:         General: Normal range of motion.   Skin:     General: Skin is warm and dry.   Neurological:      General: No focal deficit present.      Mental Status: She is alert. Mental status is at baseline.   Psychiatric:         Mood and Affect: Mood normal.         Behavior: Behavior normal.          Last Recorded Vitals  Blood pressure (!) 165/94, pulse 90, temperature 36.6 °C (97.9 °F), temperature source Temporal, resp. rate 18, height 1.524 m (5'), weight 96.2 kg (212 lb), SpO2  97%.    Relevant Results  Meds:  Scheduled medications  amLODIPine, 10 mg, oral, Daily  carvedilol, 25 mg, oral, BID  ferrous sulfate (325 mg ferrous sulfate), 1 tablet, oral, BID  losartan 100 mg, hydroCHLOROthiazide 25 mg for Hyzaar 100 mg-25 mg, , oral, Daily  medroxyPROGESTERone, 20 mg, oral, Daily  pantoprazole, 40 mg, oral, Daily before breakfast   Or  pantoprazole, 40 mg, intravenous, Daily before breakfast  spironolactone, 50 mg, oral, Daily  tranexamic acid, 1,300 mg, oral, TID      Continuous medications  sodium chloride 0.9%, 100 mL/hr      PRN medications  PRN medications: acetaminophen **OR** acetaminophen **OR** acetaminophen, albuterol, ondansetron **OR** ondansetron   Current Outpatient Medications   Medication Instructions    albuterol 90 mcg/actuation inhaler 1-2 puffs, inhalation, Every 4 hours PRN, EVERY 4-6 HOURS AS NEEDED AND AS DIRECTED.    amLODIPine (NORVASC) 10 mg, oral, Daily    carvedilol (COREG) 25 mg, oral, 2 times daily (morning and late afternoon)    ferrous sulfate 325 (65 Fe) MG tablet 1 tablet, oral, 2 times daily    losartan-hydrochlorothiazide (Hyzaar) 100-25 mg tablet 1 tablet, oral, Daily    medroxyPROGESTERone (PROVERA) 20 mg, oral, Daily    spironolactone (ALDACTONE) 50 mg, oral, Daily        Labs:  Results for orders placed or performed during the hospital encounter of 09/09/24 (from the past 24 hour(s))   CBC and Auto Differential   Result Value Ref Range    WBC 8.5 4.4 - 11.3 x10*3/uL    nRBC 0.8 (H) 0.0 - 0.0 /100 WBCs    RBC 2.17 (L) 4.00 - 5.20 x10*6/uL    Hemoglobin 4.6 (LL) 12.0 - 16.0 g/dL    Hematocrit 19.1 (L) 36.0 - 46.0 %    MCV 88 80 - 100 fL    MCH 21.2 (L) 26.0 - 34.0 pg    MCHC 24.1 (L) 32.0 - 36.0 g/dL    RDW 19.8 (H) 11.5 - 14.5 %    Platelets 501 (H) 150 - 450 x10*3/uL    Neutrophils % 68.7 40.0 - 80.0 %    Immature Granulocytes %, Automated 0.9 0.0 - 0.9 %    Lymphocytes % 22.0 13.0 - 44.0 %    Monocytes % 5.9 2.0 - 10.0 %    Eosinophils % 2.0 0.0 - 6.0 %     Basophils % 0.5 0.0 - 2.0 %    Neutrophils Absolute 5.81 1.20 - 7.70 x10*3/uL    Immature Granulocytes Absolute, Automated 0.08 0.00 - 0.70 x10*3/uL    Lymphocytes Absolute 1.86 1.20 - 4.80 x10*3/uL    Monocytes Absolute 0.50 0.10 - 1.00 x10*3/uL    Eosinophils Absolute 0.17 0.00 - 0.70 x10*3/uL    Basophils Absolute 0.04 0.00 - 0.10 x10*3/uL   Comprehensive metabolic panel   Result Value Ref Range    Glucose 103 (H) 74 - 99 mg/dL    Sodium 141 136 - 145 mmol/L    Potassium 3.4 (L) 3.5 - 5.3 mmol/L    Chloride 109 (H) 98 - 107 mmol/L    Bicarbonate 27 21 - 32 mmol/L    Anion Gap 8 (L) 10 - 20 mmol/L    Urea Nitrogen 16 6 - 23 mg/dL    Creatinine 1.37 (H) 0.50 - 1.05 mg/dL    eGFR 51 (L) >60 mL/min/1.73m*2    Calcium 8.3 (L) 8.6 - 10.3 mg/dL    Albumin 3.4 3.4 - 5.0 g/dL    Alkaline Phosphatase 52 33 - 110 U/L    Total Protein 6.0 (L) 6.4 - 8.2 g/dL    AST 10 9 - 39 U/L    Bilirubin, Total 0.2 0.0 - 1.2 mg/dL    ALT 10 7 - 45 U/L   Magnesium   Result Value Ref Range    Magnesium 2.50 (H) 1.60 - 2.40 mg/dL   Type and Screen   Result Value Ref Range    ABO TYPE B     Rh TYPE POS     ANTIBODY SCREEN NEG    Coagulation Screen   Result Value Ref Range    Protime 14.0 (H) 9.8 - 12.8 seconds    INR 1.2 (H) 0.9 - 1.1    aPTT 26 (L) 27 - 38 seconds   Protime-INR   Result Value Ref Range    Protime 14.0 (H) 9.8 - 12.8 seconds    INR 1.2 (H) 0.9 - 1.1   Troponin I, High Sensitivity   Result Value Ref Range    Troponin I, High Sensitivity 20 (H) 0 - 13 ng/L   Prepare RBC: 5 Units   Result Value Ref Range    PRODUCT CODE D1366J13     Unit Number [id number]     Unit ABO O     Unit RH NEG     XM INTEP COMP     Dispense Status TR     Blood Expiration Date 9/11/2024 11:59:00 PM EDT     PRODUCT BLOOD TYPE 9500     UNIT VOLUME 350     PRODUCT CODE B0337P70     Unit Number [id number]     Unit ABO O     Unit RH NEG     XM INTEP COMP     Dispense Status IS     Blood Expiration Date 9/13/2024 11:59:00 PM EDT     PRODUCT BLOOD  TYPE 9500     UNIT VOLUME 350     PRODUCT CODE M3318Z25     Unit Number [id number]     Unit ABO O     Unit RH NEG     XM INTEP COMP     Dispense Status TR     Blood Expiration Date 9/13/2024 11:59:00 PM EDT     PRODUCT BLOOD TYPE 9500     UNIT VOLUME 350     PRODUCT CODE L5000Y26     Unit Number [id number]     Unit ABO O     Unit RH NEG     XM INTEP COMP     Dispense Status XM     Blood Expiration Date 9/13/2024 11:59:00 PM EDT     PRODUCT BLOOD TYPE 9500     UNIT VOLUME 350     PRODUCT CODE U8142G92     Unit Number [id number]     Unit ABO B     Unit RH POS     XM INTEP COMP     Dispense Status XM     Blood Expiration Date 9/16/2024 11:59:00 PM EDT     PRODUCT BLOOD TYPE 7300     UNIT VOLUME 350    Troponin I, High Sensitivity   Result Value Ref Range    Troponin I, High Sensitivity 21 (H) 0 - 13 ng/L   Human Chorionic Gonadotropin, Serum Quantitative   Result Value Ref Range    HCG, Beta-Quantitative <2 <5 mIU/mL   hCG, Urine, Qualitative   Result Value Ref Range    HCG, Urine NEGATIVE NEGATIVE      Imaging:  No results found.     Assessment/Plan   Dysfunctional uterine bleeding related to uterine fibroid  Plan:  She is being transfused with packed red blood cells in the ER  Gynecology would like her to get TXA 1 g IV followed by 1300 mg orally 3 times a day  Will also continue her Provera  GYN is on consult and she is scheduled for gynecological procedure in the morning  She will be n.p.o. after midnight    Acute blood loss anemia secondary to uterine blood loss  She is getting transfused in the ER    Hypertension  Plan:  Amlodipine 10 mg orally daily  Losartan 100 mg orally daily  Carvedilol 25 mg twice daily  Spironolactone 50 mg orally daily    Morbid obesity BMI of 41  plan:  Lifestyle modification    DVT prophylaxis  No heparin or Lovenox when active bleeding and severe anemia  SCDs    I spent 60 minutes in the professional and overall care of this patient.      Rashid Pereyra,  DO

## 2024-09-09 NOTE — ED NOTES
Community Resource Name: List of  primary care physicians.  Phone Number:   Staff Member: Lucille Randolph    Discussed the following topics on behalf of the patient:  []  Behavioral Health Assistance     []  Case Management  []   Assistance  []  Digital Equity Assistance  []  Dental Health Assistance  []  Education Assistance  []  Employment Assistance  []  Financial Strain Relief Assistance  []  Food Insecurity Assistance  [x]  Healthcare Coverage Assistance  []  Housing Stability Assistance  []  IP Violence Relief Assistance  []  Legal Assistance  []  Physical Activity Assistance  []  Social Connection Assistance  []  Stress Relief Assistance   []  Substance Abuse Assistance  []  Transportation Assistance  []  Utility Assistance  [x]  Other: [insert comment here]  Patient doesn't have a PCP.  Next Steps:         DERIAN Madrigal  CHW talked to patient regarding not having a primary care physician. CHW asked the patient if she would be interested in looking at a list of  physicians to choose from for future services. Patient agreed and was given a list of  physicians, they are accepting new patients, they are taking her insurance([employer]),and they are in the are where she lives. Patient expressed appreciation.        DERIAN Madrigal  09/09/24 0977

## 2024-09-09 NOTE — ED PROVIDER NOTES
History/Exam limitations: none.   Additional history was obtained from patient, spouse/SO, and past medical records.          HPI:    Gretel Archibald is a 38 y.o. female PMH uterine fibroids, anemia presenting for evaluation of vaginal bleeding.  Patient states that she has had continued vaginal bleeding over the last several months.  Has been taking her suppressive medication.  States the last 2 days has been having gradually worsening bleeding.  States that she passes large clots and has 3 pads on currently.  States that she will often times go through pads every 30 minutes.  States that she had some chest discomfort midsternal earlier today which has not resolved.  Also has had some generalized weakness.  No current chest pain or shortness of breath.  Had some cramping lower abdominal discomfort similar to prior episodes of increased bleeding.        Physical Exam:  ED Triage Vitals [09/09/24 0054]   Temperature Heart Rate Respirations BP   37.2 °C (98.9 °F) (!) 105 16 (!) 179/100      Pulse Ox Temp Source Heart Rate Source Patient Position   99 % Oral Monitor Sitting      BP Location FiO2 (%)     Left arm --        GEN:      Alert, NAD  Eyes:       PERRL, EOMI  HENT:      NC/AT, OP clear, airway patent, MM  CV:      Tachycardic rate, RR, no MRG, no LE pitting edema, 2+ radial and pedal pulses  PULM:     CTAB, no w/r/r, easy WOB, symmetric chest rise  ABD:      Soft, minimal suprapubic tenderness, no rebound or guarding, ND, no masses, BS +, pelvic exam with SAMUEL Lopez as chaperone performed and multiple large approximately golf ball sized clots present, small amount of active bleeding, unable to visualize cervix, no visible lacerations  NEURO:   A&Ox3, no focal deficits    MSK:      FROM, no joint deformities or swelling, no e/o trauma  SKIN:       Warm and dry  PSYCH:    Appropriate mood and affect         MDM/ED Course:   Vy Alvarez is a 69 y.o. female PMH NIDDM, fibromyalgia, lupus, CVA, hyperlipidemia,  seizures on Keppra presenting for evaluation of seizure.  Vitals markable for hypertension and tachycardia.  Afebrile.  Exam as documented above.  Presentation most consistent with vaginal bleeding secondary to known fibroids.  Differential includes ectopic pregnancy, adenomyosis, polyp, other mass, ovulatory dysfunction, other endometrial bleeding, coagulopathy.  Patient was initially in the waiting room awaiting a room and labs resulted displaying significant anemia.  Hemoglobin 4.6 from last 9.13 months ago.  Platelets reassuring at 501.  No leukocytosis.  Chemistry with creatinine 1.36 which is near baseline.  INR 1.2, PTT 26.  Patient was consented for blood transfusion potassium ablation was given.  Given continued bleeding, 4 units to be given, initial 2 were given with rapid transfusion.  Tachycardia resolved. 5 units crossmatched.  Labs otherwise remarkable for troponin of 20 with repeat 21 unlikely ACS and near patient's baseline.  hCG negative.  Gynecology consulted as below.  Patient updated regarding plan.  Remained hemodynamically stable in the ED.  B/l proximal IVs in place. Patient was accepted for admission by admitting team for continued management in stable condition.     ED Course as of 09/13/24 1808   Mon Sep 09, 2024   0351 I was notified the patient appeared to have a run of nonsustained ventricular tachycardia.  Patient was asymptomatic.  Reviewed the monitor strip, there do appear to be QRSs buried within, suspect this is likely artifact, no further episodes similar appearance with movement, will maintain on telemetry. [JM]   0506 Spoke to Dr. Matias, recommend giving 1 g IV TXA followed by 1300 TXA oral every 8 hours. [JM]   0511 If bleeding improves, likely plan for procedure Tuesday as planned, if continuing to have bleeding GYN may take for procedure today. [JM]      ED Course User Index  [JM] Perfecto Plata MD         Diagnoses as of 09/13/24 1808   Dysfunctional uterine bleeding    Anemia, unspecified type   Vaginal bleeding   Uterine leiomyoma, unspecified location       Chronic medical conditions affecting care listed in MDM. I independently reviewed imaging studies and agreed with radiology reads. I reviewed recent and relevant outside records including PCP notes, Prior discharge summaries, and prior radiology reports.    Procedure  Critical Care    Performed by: Perfecto Plata MD  Authorized by: Perfecto Plata MD    Critical care provider statement:     Critical care time (minutes):  40    Critical care time was exclusive of:  Separately billable procedures and treating other patients    Critical care was necessary to treat or prevent imminent or life-threatening deterioration of the following conditions: marked symptomatic anemia requiring blood transfusion, vaginal bleeding.    Critical care was time spent personally by me on the following activities:  Development of treatment plan with patient or surrogate, discussions with consultants, evaluation of patient's response to treatment, examination of patient, interpretation of cardiac output measurements, obtaining history from patient or surrogate, ordering and review of laboratory studies, ordering and performing treatments and interventions, pulse oximetry, re-evaluation of patient's condition and review of old charts    Care discussed with: admitting provider        Diagnosis:   1.  Vaginal bleeding  2.  Symptomatic anemia  3.  Uterine fibroids    Dispo: Hospitalized in stable condition      Disclaimer: Portions of this note were dictated by speech recognition. An attempt at proof reading was made to minimize errors. Minor errors in transcription may be present.  Please call if questions.     Perfecto Plata MD  09/13/24 1681

## 2024-09-10 ENCOUNTER — ANESTHESIA (OUTPATIENT)
Dept: OPERATING ROOM | Facility: HOSPITAL | Age: 39
End: 2024-09-10
Payer: MEDICARE

## 2024-09-10 VITALS
DIASTOLIC BLOOD PRESSURE: 82 MMHG | HEIGHT: 60 IN | RESPIRATION RATE: 20 BRPM | HEART RATE: 72 BPM | TEMPERATURE: 98.7 F | BODY MASS INDEX: 41.62 KG/M2 | SYSTOLIC BLOOD PRESSURE: 122 MMHG | OXYGEN SATURATION: 99 % | WEIGHT: 212 LBS

## 2024-09-10 LAB
ANION GAP SERPL CALC-SCNC: 11 MMOL/L (ref 10–20)
BLOOD EXPIRATION DATE: NORMAL
BUN SERPL-MCNC: 11 MG/DL (ref 6–23)
CALCIUM SERPL-MCNC: 8.1 MG/DL (ref 8.6–10.3)
CHLORIDE SERPL-SCNC: 109 MMOL/L (ref 98–107)
CO2 SERPL-SCNC: 21 MMOL/L (ref 21–32)
CREAT SERPL-MCNC: 1.18 MG/DL (ref 0.5–1.05)
DISPENSE STATUS: NORMAL
EGFRCR SERPLBLD CKD-EPI 2021: 61 ML/MIN/1.73M*2
ERYTHROCYTE [DISTWIDTH] IN BLOOD BY AUTOMATED COUNT: 17.5 % (ref 11.5–14.5)
GLUCOSE BLD MANUAL STRIP-MCNC: 95 MG/DL (ref 74–99)
GLUCOSE SERPL-MCNC: 89 MG/DL (ref 74–99)
HCT VFR BLD AUTO: 27.6 % (ref 36–46)
HGB BLD-MCNC: 8.5 G/DL (ref 12–16)
MCH RBC QN AUTO: 25.1 PG (ref 26–34)
MCHC RBC AUTO-ENTMCNC: 30.8 G/DL (ref 32–36)
MCV RBC AUTO: 81 FL (ref 80–100)
NRBC BLD-RTO: 1.2 /100 WBCS (ref 0–0)
PLATELET # BLD AUTO: 428 X10*3/UL (ref 150–450)
POTASSIUM SERPL-SCNC: 3.4 MMOL/L (ref 3.5–5.3)
PRODUCT BLOOD TYPE: 7300
PRODUCT BLOOD TYPE: 9500
PRODUCT CODE: NORMAL
RBC # BLD AUTO: 3.39 X10*6/UL (ref 4–5.2)
SODIUM SERPL-SCNC: 138 MMOL/L (ref 136–145)
UNIT ABO: NORMAL
UNIT NUMBER: NORMAL
UNIT RH: NORMAL
UNIT VOLUME: 350
WBC # BLD AUTO: 13.9 X10*3/UL (ref 4.4–11.3)
XM INTEP: NORMAL

## 2024-09-10 PROCEDURE — A58558 PR HYSTEROSCOPY,W/ENDO BX: Performed by: ANESTHESIOLOGY

## 2024-09-10 PROCEDURE — 2500000002 HC RX 250 W HCPCS SELF ADMINISTERED DRUGS (ALT 637 FOR MEDICARE OP, ALT 636 FOR OP/ED): Performed by: INTERNAL MEDICINE

## 2024-09-10 PROCEDURE — 2500000005 HC RX 250 GENERAL PHARMACY W/O HCPCS

## 2024-09-10 PROCEDURE — 2720000007 HC OR 272 NO HCPCS: Performed by: OBSTETRICS & GYNECOLOGY

## 2024-09-10 PROCEDURE — 0UB98ZX EXCISION OF UTERUS, VIA NATURAL OR ARTIFICIAL OPENING ENDOSCOPIC, DIAGNOSTIC: ICD-10-PCS | Performed by: OBSTETRICS & GYNECOLOGY

## 2024-09-10 PROCEDURE — 58558 HYSTEROSCOPY BIOPSY: CPT | Performed by: OBSTETRICS & GYNECOLOGY

## 2024-09-10 PROCEDURE — 2500000004 HC RX 250 GENERAL PHARMACY W/ HCPCS (ALT 636 FOR OP/ED): Mod: JZ | Performed by: OBSTETRICS & GYNECOLOGY

## 2024-09-10 PROCEDURE — 7100000002 HC RECOVERY ROOM TIME - EACH INCREMENTAL 1 MINUTE: Performed by: OBSTETRICS & GYNECOLOGY

## 2024-09-10 PROCEDURE — 3600000002 HC OR TIME - INITIAL BASE CHARGE - PROCEDURE LEVEL TWO: Performed by: OBSTETRICS & GYNECOLOGY

## 2024-09-10 PROCEDURE — 0UB98ZZ EXCISION OF UTERUS, VIA NATURAL OR ARTIFICIAL OPENING ENDOSCOPIC: ICD-10-PCS | Performed by: OBSTETRICS & GYNECOLOGY

## 2024-09-10 PROCEDURE — 3700000002 HC GENERAL ANESTHESIA TIME - EACH INCREMENTAL 1 MINUTE: Performed by: OBSTETRICS & GYNECOLOGY

## 2024-09-10 PROCEDURE — 36415 COLL VENOUS BLD VENIPUNCTURE: CPT | Performed by: INTERNAL MEDICINE

## 2024-09-10 PROCEDURE — 88305 TISSUE EXAM BY PATHOLOGIST: CPT | Mod: TC,AHULAB | Performed by: OBSTETRICS & GYNECOLOGY

## 2024-09-10 PROCEDURE — 7100000001 HC RECOVERY ROOM TIME - INITIAL BASE CHARGE: Performed by: OBSTETRICS & GYNECOLOGY

## 2024-09-10 PROCEDURE — 82374 ASSAY BLOOD CARBON DIOXIDE: CPT | Performed by: INTERNAL MEDICINE

## 2024-09-10 PROCEDURE — 82947 ASSAY GLUCOSE BLOOD QUANT: CPT

## 2024-09-10 PROCEDURE — 2500000005 HC RX 250 GENERAL PHARMACY W/O HCPCS: Performed by: ANESTHESIOLOGY

## 2024-09-10 PROCEDURE — 2500000004 HC RX 250 GENERAL PHARMACY W/ HCPCS (ALT 636 FOR OP/ED): Performed by: ANESTHESIOLOGY

## 2024-09-10 PROCEDURE — A58558 PR HYSTEROSCOPY,W/ENDO BX

## 2024-09-10 PROCEDURE — 3600000007 HC OR TIME - EACH INCREMENTAL 1 MINUTE - PROCEDURE LEVEL TWO: Performed by: OBSTETRICS & GYNECOLOGY

## 2024-09-10 PROCEDURE — 3700000001 HC GENERAL ANESTHESIA TIME - INITIAL BASE CHARGE: Performed by: OBSTETRICS & GYNECOLOGY

## 2024-09-10 PROCEDURE — 2500000004 HC RX 250 GENERAL PHARMACY W/ HCPCS (ALT 636 FOR OP/ED)

## 2024-09-10 PROCEDURE — 85027 COMPLETE CBC AUTOMATED: CPT | Performed by: INTERNAL MEDICINE

## 2024-09-10 PROCEDURE — 2500000001 HC RX 250 WO HCPCS SELF ADMINISTERED DRUGS (ALT 637 FOR MEDICARE OP): Performed by: INTERNAL MEDICINE

## 2024-09-10 PROCEDURE — 2500000005 HC RX 250 GENERAL PHARMACY W/O HCPCS: Performed by: OBSTETRICS & GYNECOLOGY

## 2024-09-10 PROCEDURE — 99233 SBSQ HOSP IP/OBS HIGH 50: CPT | Performed by: INTERNAL MEDICINE

## 2024-09-10 RX ORDER — ONDANSETRON HYDROCHLORIDE 2 MG/ML
4 INJECTION, SOLUTION INTRAVENOUS ONCE AS NEEDED
Status: DISCONTINUED | OUTPATIENT
Start: 2024-09-10 | End: 2024-09-10 | Stop reason: HOSPADM

## 2024-09-10 RX ORDER — FENTANYL CITRATE 50 UG/ML
INJECTION, SOLUTION INTRAMUSCULAR; INTRAVENOUS AS NEEDED
Status: DISCONTINUED | OUTPATIENT
Start: 2024-09-10 | End: 2024-09-10

## 2024-09-10 RX ORDER — LIDOCAINE HYDROCHLORIDE 10 MG/ML
0.1 INJECTION, SOLUTION EPIDURAL; INFILTRATION; INTRACAUDAL; PERINEURAL ONCE
Status: DISCONTINUED | OUTPATIENT
Start: 2024-09-10 | End: 2024-09-10 | Stop reason: HOSPADM

## 2024-09-10 RX ORDER — FENTANYL CITRATE 50 UG/ML
50 INJECTION, SOLUTION INTRAMUSCULAR; INTRAVENOUS EVERY 5 MIN PRN
Status: DISCONTINUED | OUTPATIENT
Start: 2024-09-10 | End: 2024-09-10 | Stop reason: HOSPADM

## 2024-09-10 RX ORDER — LABETALOL HYDROCHLORIDE 5 MG/ML
5 INJECTION, SOLUTION INTRAVENOUS ONCE AS NEEDED
Status: DISCONTINUED | OUTPATIENT
Start: 2024-09-10 | End: 2024-09-10 | Stop reason: HOSPADM

## 2024-09-10 RX ORDER — ONDANSETRON HYDROCHLORIDE 2 MG/ML
INJECTION, SOLUTION INTRAVENOUS AS NEEDED
Status: DISCONTINUED | OUTPATIENT
Start: 2024-09-10 | End: 2024-09-10

## 2024-09-10 RX ORDER — PROPOFOL 10 MG/ML
INJECTION, EMULSION INTRAVENOUS AS NEEDED
Status: DISCONTINUED | OUTPATIENT
Start: 2024-09-10 | End: 2024-09-10

## 2024-09-10 RX ORDER — POTASSIUM CHLORIDE 20 MEQ/1
20 TABLET, EXTENDED RELEASE ORAL ONCE
Status: COMPLETED | OUTPATIENT
Start: 2024-09-10 | End: 2024-09-10

## 2024-09-10 RX ORDER — FENTANYL CITRATE 50 UG/ML
25 INJECTION, SOLUTION INTRAMUSCULAR; INTRAVENOUS EVERY 5 MIN PRN
Status: DISCONTINUED | OUTPATIENT
Start: 2024-09-10 | End: 2024-09-10 | Stop reason: HOSPADM

## 2024-09-10 RX ORDER — SODIUM CHLORIDE 0.9 G/100ML
IRRIGANT IRRIGATION AS NEEDED
Status: DISCONTINUED | OUTPATIENT
Start: 2024-09-10 | End: 2024-09-10 | Stop reason: HOSPADM

## 2024-09-10 RX ORDER — OXYCODONE HYDROCHLORIDE 5 MG/1
5 TABLET ORAL EVERY 4 HOURS PRN
Status: DISCONTINUED | OUTPATIENT
Start: 2024-09-10 | End: 2024-09-10 | Stop reason: HOSPADM

## 2024-09-10 RX ORDER — SODIUM CHLORIDE, SODIUM LACTATE, POTASSIUM CHLORIDE, CALCIUM CHLORIDE 600; 310; 30; 20 MG/100ML; MG/100ML; MG/100ML; MG/100ML
100 INJECTION, SOLUTION INTRAVENOUS CONTINUOUS
Status: DISCONTINUED | OUTPATIENT
Start: 2024-09-10 | End: 2024-09-10 | Stop reason: HOSPADM

## 2024-09-10 RX ORDER — MIDAZOLAM HYDROCHLORIDE 1 MG/ML
INJECTION, SOLUTION INTRAMUSCULAR; INTRAVENOUS AS NEEDED
Status: DISCONTINUED | OUTPATIENT
Start: 2024-09-10 | End: 2024-09-10

## 2024-09-10 RX ORDER — LIDOCAINE HYDROCHLORIDE 20 MG/ML
INJECTION, SOLUTION INFILTRATION; PERINEURAL AS NEEDED
Status: DISCONTINUED | OUTPATIENT
Start: 2024-09-10 | End: 2024-09-10

## 2024-09-10 SDOH — HEALTH STABILITY: MENTAL HEALTH: CURRENT SMOKER: 0

## 2024-09-10 ASSESSMENT — COGNITIVE AND FUNCTIONAL STATUS - GENERAL
MOBILITY SCORE: 24
DAILY ACTIVITIY SCORE: 24

## 2024-09-10 ASSESSMENT — PAIN - FUNCTIONAL ASSESSMENT
PAIN_FUNCTIONAL_ASSESSMENT: UNABLE TO SELF-REPORT
PAIN_FUNCTIONAL_ASSESSMENT: 0-10

## 2024-09-10 ASSESSMENT — PAIN SCALES - GENERAL
PAINLEVEL_OUTOF10: 4
PAINLEVEL_OUTOF10: 0 - NO PAIN
PAINLEVEL_OUTOF10: 0 - NO PAIN
PAINLEVEL_OUTOF10: 4
PAIN_LEVEL: 4
PAINLEVEL_OUTOF10: 7

## 2024-09-10 NOTE — ANESTHESIA POSTPROCEDURE EVALUATION
Patient: Gretel Archibald    Procedure Summary       Date: 09/10/24 Room / Location: [facility] A OR 04 / [facility]    Anesthesia Start: 1256 Anesthesia Stop: 1353    Procedure: D & C Hysteroscopy; Uterus Biospy (Pelvis) Diagnosis:       Submucous uterine fibroid      (Submucous uterine fibroid [D25.0])    Surgeons: Mukul Leger MD Responsible Provider: Ney Parekh MD    Anesthesia Type: general ASA Status: 2            Anesthesia Type: general    Vitals Value Taken Time   /79 09/10/24 1400   Temp 36 °C (96.8 °F) 09/10/24 1351   Pulse 62 09/10/24 1400   Resp 22 09/10/24 1400   SpO2 98 % 09/10/24 1400       Anesthesia Post Evaluation    Patient location during evaluation: PACU  Patient participation: complete - patient participated  Level of consciousness: awake and alert  Pain score: 4  Pain management: adequate  Airway patency: patent  Cardiovascular status: acceptable  Respiratory status: acceptable  Hydration status: acceptable  Postoperative Nausea and Vomiting: none    No notable events documented.

## 2024-09-10 NOTE — PROGRESS NOTES
Gretel Archibald is a 38 y.o. female on day 1 of admission presenting with Dysfunctional uterine bleeding.      Subjective   On room air, no bleeding reported overnight, h/h much better s/p blood. Afebrile. Eager to be discharged later today after her procedure.        Objective     Last Recorded Vitals  /82 (BP Location: Left arm, Patient Position: Lying)   Pulse 79   Temp 36.9 °C (98.5 °F) (Oral)   Resp 18   Wt 96.2 kg (212 lb)   SpO2 98%   Intake/Output last 3 Shifts:  No intake or output data in the 24 hours ending 09/10/24 1127    Admission Weight  Weight: 96.2 kg (212 lb) (09/09/24 0054)    Daily Weight  09/09/24 : 96.2 kg (212 lb)    Image Results  ECG 12 lead  Sinus tachycardia  Abnormal QRS-T angle, consider primary T wave abnormality  Abnormal ECG  When compared with ECG of 03-JUN-2024 14:26,  Questionable change in QRS axis  T wave inversion now evident in Inferior leads  Nonspecific T wave abnormality no longer evident in Lateral leads  Confirmed by Carson Guerrero ([id number]) on 9/9/2024 10:08:27 PM      Physical Exam  Constitutional:       Appearance: Normal appearance.   Cardiovascular:      Rate and Rhythm: Normal rate and regular rhythm.   Pulmonary:      Effort: Pulmonary effort is normal.      Breath sounds: Normal breath sounds.   Abdominal:      General: Abdomen is flat. Bowel sounds are normal.      Palpations: Abdomen is soft.   Musculoskeletal:      Cervical back: Normal range of motion.   Skin:     General: Skin is warm.   Neurological:      General: No focal deficit present.      Mental Status: She is alert and oriented to person, place, and time. Mental status is at baseline.   Psychiatric:         Mood and Affect: Mood normal.         Behavior: Behavior normal.         Thought Content: Thought content normal.         Judgment: Judgment normal.     /82 (BP Location: Left arm, Patient Position: Lying)   Pulse 79   Temp 36.9 °C (98.5 °F) (Oral)   Resp 18   Ht 1.524 m (5')    Wt 96.2 kg (212 lb)   SpO2 98%   BMI 41.40 kg/m²       Relevant Results    Assessment & Plan  Dysfunctional uterine bleeding    Submucous uterine fibroid    9/10:  Acute blood loss anemia, prolapsed fibroid with bleeding... s/p blood, h/h looks stable today, currently on Tranexamic acid and provera... f/up OBGYN for D&C hysterectomy today.     HypoK... replace and monitor.     Home regimen for chronic conditions  Dc home this evening if cleared by obgyn.          Bharathi Juarez MD

## 2024-09-10 NOTE — NURSING NOTE
Discharge instructions provided using teach back method. Pt's health related  risk factors discussed with pt. pt educated to look for any worsening sign and symptoms. Pt educated to seek medical attention if experience any medical emergency. Pt aware to follow up with outpatient clinics as scheduled. Home going meds reviewed with pt. Pt verbalized understanding of disposition and discharge instructions. All questions answered to patient's satisfaction and within nursing scope of practice. Vitals stable, IV removed. Pt's gold ring returned to pt that was found in a biohazard bag with pt's label on, in pt's chart.

## 2024-09-10 NOTE — ANESTHESIA PROCEDURE NOTES
Airway  Date/Time: 9/10/2024 1:04 PM  Urgency: elective    Airway not difficult    Staffing  Performed: ANDERS   Authorized by: eNy Parekh MD    Performed by: ANDERS Borjas  Patient location during procedure: OR    Indications and Patient Condition  Indications for airway management: anesthesia  Spontaneous Ventilation: absent  Sedation level: deep  Preoxygenated: yes  Patient position: sniffing  Mask difficulty assessment: 0 - not attempted    Final Airway Details  Final airway type: supraglottic airway      Successful airway: Size 4     Number of attempts at approach: 1

## 2024-09-10 NOTE — ANESTHESIA PREPROCEDURE EVALUATION
Patient: Gretel Archibald    Procedure Information       Anesthesia Start Date/Time: 09/10/24 1256    Procedure: D & C Hysteroscopy; Uterus Biospy (Pelvis)    Location: [facility] A OR 04 / [facility] A OR    Surgeons: Mukul Leger MD            Relevant Problems   Cardiac   (+) Benign essential hypertension   (+) Chest pain   (+) Pleuritic chest pain      Pulmonary   (+) Asthmatic bronchitis with exacerbation (HHS-HCC)      Endocrine   (+) Adult-onset obesity      Hematology   (+) Anemia   (+) Anemia, unspecified type   (+) Iron deficiency anemia, unspecified      HEENT   (+) Sinus pressure      ID   (+) COVID-19      Skin   (+) Skin rash      GYN   (+) Abnormal uterine bleeding   (+) Dysfunctional uterine bleeding   (+) Menorrhagia   (+) Submucous uterine fibroid   (+) Uterine fibroid       Clinical information reviewed:    Allergies  Meds  Problems              NPO Detail:  NPO/Void Status  Date of Last Liquid: 09/09/24  Time of Last Liquid: 2300  Date of Last Solid: 09/09/24  Time of Last Solid: 2300         Physical Exam    Airway  Mallampati: I  TM distance: >3 FB  Neck ROM: full     Cardiovascular - normal exam     Dental - normal exam     Pulmonary - normal exam     Abdominal - normal exam         Anesthesia Plan    History of general anesthesia?: yes  History of complications of general anesthesia?: no    ASA 2     general     The patient is not a current smoker.  Patient was not previously instructed to abstain from smoking on day of procedure.  Patient did not smoke on day of procedure.    intravenous induction   Postoperative administration of opioids is intended.  Anesthetic plan and risks discussed with patient.  Use of blood products discussed with patient who.    Plan discussed with CAA.

## 2024-09-10 NOTE — PROGRESS NOTES
Gretel Archibald is a 38 y.o. female on day 1 of admission presenting with Dysfunctional uterine bleeding.    Plan: patient to have surgery with GYN this morning for fibroids related to severe anemia. D&C hysteroscopy placement of IUD per GYN note.  Disposition: Home  Barrier: H/H, surgery   ADOD:  tomorrow     09/10/24 0492   Discharge Planning   Expected Discharge Disposition Home       Laverne Nuñez RN

## 2024-09-10 NOTE — OP NOTE
D & C Hysteroscopy; Uterus Biospy Operative Note     Date: 9/10/2024  OR Location: [facility] OR    Name: Gretel Archibald, : 1985, Age: 38 y.o., MRN: [id number], Sex: female    Diagnosis  Pre-op Diagnosis      * Submucous uterine fibroid [D25.0] Post-op Diagnosis     * Submucous uterine fibroid [D25.0]     Procedures  D & C Hysteroscopy; Uterus Biospy  [id number] - [place] HYSTEROSCOPY BX ENDOMETRIUM&/POLYPC W/WO D&C      Surgeons      * Mukul Leger - Primary    Resident/Fellow/Other Assistant:  Surgeons and Role:  * No surgeons found with a matching role *    Procedure Summary  Anesthesia: Anesthesia type not filed in the log.  ASA: ASA status not filed in the log.  Anesthesia Staff: Anesthesiologist: Ney Parekh MD  C-AA: ANDERS Borjas  Estimated Blood Loss: 10mL  Intra-op Medications:   Administrations occurring from 1300 to 1415 on 09/10/24:   Medication Name Total Dose   sodium chloride 0.9 % irrigation solution 3,000 mL   vasopressin (Vasostrict) 20 unit/mL 20 Units in sodium chloride (PF) 0.9% 30 mL mixture 6 mL              Anesthesia Record               Intraprocedure I/O Totals          Intake    LR bolus 200.00 mL    Total Intake 200 mL       Output    Est. Blood Loss 5 mL    Total Output 5 mL       Net    Net Volume 195 mL          Specimen:   ID Type Source Tests Collected by Time   1 : UTERINE FIBROID Tissue FIBROID HYSTEROSCOPIC RESECTION SURGICAL PATHOLOGY EXAM Mukul Leger MD 9/10/2024 1310   2 : SUBMUCOSAL FIBROID Tissue ENDOMETRIUM POLYPECTOMY - HYSTEROSCOPIC SURGICAL PATHOLOGY EXAM Mukul Leger MD 9/10/2024 1339   3 : ENDOMETRIAL CURRETTINGS Tissue ENDOMETRIUM CURETTINGS SURGICAL PATHOLOGY EXAM Mukul Leger MD 9/10/2024 1340        Staff:   Circulator: Maria Isabel  Circulator: Khanh Gutierrez Person: Sindy         Drains and/or Catheters: * None in log *    Tourniquet Times:         Implants:     Findings: Prolapsed 4 cm fibroid.  2 submucosal fibroids    Indications: Gretel Archibald  is an 38 y.o. female who is having surgery for Submucous uterine fibroid [D25.0].     The patient was seen in the preoperative area. The risks, benefits, complications, treatment options, non-operative alternatives, expected recovery and outcomes were discussed with the patient. The possibilities of reaction to medication, pulmonary aspiration, injury to surrounding structures, bleeding, recurrent infection, the need for additional procedures, failure to diagnose a condition, and creating a complication requiring transfusion or operation were discussed with the patient. The patient concurred with the proposed plan, giving informed consent.  The site of surgery was properly noted/marked if necessary per policy. The patient has been actively warmed in preoperative area. Preoperative antibiotics are not indicated. Venous thrombosis prophylaxis have been ordered including bilateral sequential compression devices    Procedure Details:   Patient consented and preoperative huddle performed.  Prepped and draped in usual fashion.  Red rubber catheter used to drain the bladder.  Weighted speculum placed into the posterior fornix of the vagina Rae retractor was used to visualize the prolapsed fibroid within the vagina.  A right angle retractor was used to elevate the anterior lip of the cervix.  Stock of the prolapsed fibroid was then isolated and severed using a Bovie tip on cut.  Resulting in a patulous cervix.  Able to palpate submucosal fibroids within the uterine cavity.  We clamped the cervix with 2 ring clamps laterally allowing us to introduce a hysteroscope with adequate water seal.  Able to visualize the fibroids and ostia.  Uterus sounded to 8 cm.  A flex resector was then placed within the uterine cavity and both fibroids were resected.  Sharp curettings were done throughout 3 and 6 degrees and across the fundus and sent as a endometrial sample.  Pathology included prolapsed fibroid.  Submucosal fibroid resected.   An endometrial curettings.  We did inject vasopressin at both 3 and 9:00 and into the submucosal fibroid approximately 6 cc.  Patient tolerated procedure well and should be able to be discharged home and follow-up in 1 to 2 weeks      Complications:  None; patient tolerated the procedure well.    Disposition: PACU - hemodynamically stable.  Condition: stable         Additional Details: Discharge to home with follow-up in the office in 1 to 2 weeks    Attending Attestation: A qualified resident physician was not available.    Mukul Leger  Phone Number: [phone]

## 2024-09-17 LAB
LABORATORY COMMENT REPORT: NORMAL
PATH REPORT.FINAL DX SPEC: NORMAL
PATH REPORT.GROSS SPEC: NORMAL
PATH REPORT.RELEVANT HX SPEC: NORMAL
PATH REPORT.TOTAL CANCER: NORMAL
RESIDENT REVIEW: NORMAL

## 2024-09-18 ENCOUNTER — APPOINTMENT (OUTPATIENT)
Dept: OBSTETRICS AND GYNECOLOGY | Facility: CLINIC | Age: 39
End: 2024-09-18
Payer: MEDICARE

## 2024-09-23 ENCOUNTER — OFFICE VISIT (OUTPATIENT)
Dept: OBSTETRICS AND GYNECOLOGY | Facility: CLINIC | Age: 39
End: 2024-09-23
Payer: MEDICARE

## 2024-09-23 VITALS
DIASTOLIC BLOOD PRESSURE: 92 MMHG | WEIGHT: 209 LBS | SYSTOLIC BLOOD PRESSURE: 184 MMHG | HEIGHT: 60 IN | BODY MASS INDEX: 41.03 KG/M2

## 2024-09-23 DIAGNOSIS — N93.9 ABNORMAL UTERINE BLEEDING (AUB): Primary | ICD-10-CM

## 2024-09-23 DIAGNOSIS — D50.0 IRON DEFICIENCY ANEMIA DUE TO CHRONIC BLOOD LOSS: ICD-10-CM

## 2024-09-23 PROCEDURE — 3008F BODY MASS INDEX DOCD: CPT | Performed by: OBSTETRICS & GYNECOLOGY

## 2024-09-23 PROCEDURE — 3080F DIAST BP >= 90 MM HG: CPT | Performed by: OBSTETRICS & GYNECOLOGY

## 2024-09-23 PROCEDURE — 99213 OFFICE O/P EST LOW 20 MIN: CPT | Performed by: OBSTETRICS & GYNECOLOGY

## 2024-09-23 PROCEDURE — 3077F SYST BP >= 140 MM HG: CPT | Performed by: OBSTETRICS & GYNECOLOGY

## 2024-09-23 ASSESSMENT — ENCOUNTER SYMPTOMS
MUSCULOSKELETAL NEGATIVE: 0
CARDIOVASCULAR NEGATIVE: 0
NEUROLOGICAL NEGATIVE: 0
EYES NEGATIVE: 0
ALLERGIC/IMMUNOLOGIC NEGATIVE: 0
PSYCHIATRIC NEGATIVE: 0
GASTROINTESTINAL NEGATIVE: 0
RESPIRATORY NEGATIVE: 0
ENDOCRINE NEGATIVE: 0
CONSTITUTIONAL NEGATIVE: 0
HEMATOLOGIC/LYMPHATIC NEGATIVE: 0

## 2024-09-23 ASSESSMENT — PAIN SCALES - GENERAL: PAINLEVEL: 0-NO PAIN

## 2024-09-23 NOTE — PROGRESS NOTES
Subjective   Patient ID: Gretel Archibald is a 38 y.o. female who presents for Postop Visit (Postop visit no pap on file).  HPI  Patient is a 38-year-old female  1 para 0 here for a postoperative checkup after a D&C hysteroscopy and removal of prolapsed fibroid.  She had significant vaginal bleeding causing anemia and blood transfusion.  She was admitted to the hospital where eventually were able to do a D&C hysteroscopy and excision of fibroid.  She would like an IUD for more long-term control of bleeding.      Pathology reviewed and benign leiomyoma      Review of Systems    Objective   Physical Exam  Pelvic exam: External genitalia Bartholin's urethra and Semmes's normal.  Vaginal vault without blood or discharge.  On bimanual exam cervix is still patulous but the uterus is well involuted.  No adnexal masses  Assessment/Plan   Follow-up for insertion of Mirena IUD for future cycle control.    Check CBC for continued anemia and need for treatment             Mukul Leger MD 24 3:18 PM

## 2024-10-14 ENCOUNTER — APPOINTMENT (OUTPATIENT)
Dept: OBSTETRICS AND GYNECOLOGY | Facility: CLINIC | Age: 39
End: 2024-10-14
Payer: MEDICARE

## 2025-02-26 ENCOUNTER — E-VISIT (OUTPATIENT)
Dept: PRIMARY CARE | Facility: CLINIC | Age: 40
End: 2025-02-26
Payer: MEDICARE

## 2025-07-13 ENCOUNTER — OFFICE VISIT (OUTPATIENT)
Dept: URGENT CARE | Age: 40
End: 2025-07-13
Payer: MEDICARE

## 2025-07-13 VITALS
WEIGHT: 225 LBS | BODY MASS INDEX: 44.17 KG/M2 | RESPIRATION RATE: 18 BRPM | HEART RATE: 82 BPM | OXYGEN SATURATION: 97 % | TEMPERATURE: 98.4 F | SYSTOLIC BLOOD PRESSURE: 166 MMHG | DIASTOLIC BLOOD PRESSURE: 94 MMHG | HEIGHT: 60 IN

## 2025-07-13 DIAGNOSIS — I10 HYPERTENSION, UNSPECIFIED TYPE: Primary | ICD-10-CM

## 2025-07-13 DIAGNOSIS — J45.21 MILD INTERMITTENT ASTHMATIC BRONCHITIS WITH ACUTE EXACERBATION (HHS-HCC): ICD-10-CM

## 2025-07-13 DIAGNOSIS — I11.0 HYPERTENSIVE HEART DISEASE WITH HEART FAILURE: ICD-10-CM

## 2025-07-13 DIAGNOSIS — I10 BENIGN ESSENTIAL HYPERTENSION: ICD-10-CM

## 2025-07-13 PROCEDURE — 3008F BODY MASS INDEX DOCD: CPT | Performed by: NURSE PRACTITIONER

## 2025-07-13 PROCEDURE — 3077F SYST BP >= 140 MM HG: CPT | Performed by: NURSE PRACTITIONER

## 2025-07-13 PROCEDURE — 3080F DIAST BP >= 90 MM HG: CPT | Performed by: NURSE PRACTITIONER

## 2025-07-13 PROCEDURE — 99203 OFFICE O/P NEW LOW 30 MIN: CPT | Performed by: NURSE PRACTITIONER

## 2025-07-13 RX ORDER — LOSARTAN POTASSIUM AND HYDROCHLOROTHIAZIDE 25; 100 MG/1; MG/1
1 TABLET ORAL DAILY
Qty: 30 TABLET | Refills: 1 | Status: SHIPPED | OUTPATIENT
Start: 2025-07-13 | End: 2025-08-12

## 2025-07-13 RX ORDER — CARVEDILOL 25 MG/1
25 TABLET ORAL
Qty: 60 TABLET | Refills: 1 | Status: SHIPPED | OUTPATIENT
Start: 2025-07-13 | End: 2025-08-12

## 2025-07-13 RX ORDER — SPIRONOLACTONE 50 MG/1
50 TABLET, FILM COATED ORAL DAILY
Qty: 30 TABLET | Refills: 1 | Status: SHIPPED | OUTPATIENT
Start: 2025-07-13 | End: 2025-08-12

## 2025-07-13 ASSESSMENT — ENCOUNTER SYMPTOMS
HEMATOLOGIC/LYMPHATIC NEGATIVE: 1
NEUROLOGICAL NEGATIVE: 1
PSYCHIATRIC NEGATIVE: 1
CARDIOVASCULAR NEGATIVE: 1
EYES NEGATIVE: 1
ENDOCRINE NEGATIVE: 1
GASTROINTESTINAL NEGATIVE: 1
ALLERGIC/IMMUNOLOGIC NEGATIVE: 1
MUSCULOSKELETAL NEGATIVE: 1
CONSTITUTIONAL NEGATIVE: 1
RESPIRATORY NEGATIVE: 1

## 2025-07-13 ASSESSMENT — PAIN SCALES - GENERAL: PAINLEVEL_OUTOF10: 0-NO PAIN

## 2025-07-14 NOTE — PROGRESS NOTES
Subjective   Patient ID: Gretel Archibald is a 39 y.o. female. They present today with a chief complaint of Blood Pressure Check (Needs 4 meds sent to pharmacy , yary ) and Headache.    History of Present Illness    History provided by:  Patient   used: No    Other  Location:  Med refill, pt;s PCP left, needs all her b/p meds,  has been off for last 5 days      Past Medical History  Allergies as of 07/13/2025 - Reviewed 07/13/2025   Allergen Reaction Noted    Codeine Hives 09/09/2024    Morphine Hives 03/06/2023    Oxycodone-acetaminophen Hives 03/06/2023       Prescriptions Prior to Admission[1]     Medical History[2]    Surgical History[3]     reports that she has been smoking cigarettes. She has never used smokeless tobacco. She reports that she does not currently use alcohol. She reports that she does not use drugs.    Review of Systems  Review of Systems   Constitutional: Negative.    HENT: Negative.     Eyes: Negative.    Respiratory: Negative.     Cardiovascular: Negative.    Gastrointestinal: Negative.    Endocrine: Negative.    Genitourinary: Negative.    Musculoskeletal: Negative.    Allergic/Immunologic: Negative.    Neurological: Negative.    Hematological: Negative.    Psychiatric/Behavioral: Negative.     All other systems reviewed and are negative.                                 Objective    Vitals:    07/13/25 1316   BP: (!) 166/94   BP Location: Right arm   Patient Position: Sitting   BP Cuff Size: Adult   Pulse: 82   Resp: 18   Temp: 36.9 °C (98.4 °F)   TempSrc: Oral   SpO2: 97%   Weight: 102 kg (225 lb)   Height: (!) 1.524 m (5')     Patient's last menstrual period was 07/10/2025.    Physical Exam  Vitals and nursing note reviewed.   Constitutional:       Appearance: Normal appearance.   HENT:      Head: Normocephalic and atraumatic.   Cardiovascular:      Rate and Rhythm: Normal rate and regular rhythm.      Pulses: Normal pulses.      Heart sounds: Normal heart sounds.    Pulmonary:      Effort: Pulmonary effort is normal.      Breath sounds: Normal breath sounds.   Abdominal:      General: Bowel sounds are normal.      Palpations: Abdomen is soft.   Skin:     General: Skin is warm and dry.   Neurological:      General: No focal deficit present.      Mental Status: She is alert and oriented to person, place, and time. Mental status is at baseline.   Psychiatric:         Mood and Affect: Mood normal.         Behavior: Behavior normal.         Thought Content: Thought content normal.         Judgment: Judgment normal.         Procedures    Point of Care Test & Imaging Results from this visit  No results found for this visit on 07/13/25.   Imaging  No results found.    Cardiology, Vascular, and Other Imaging  No other imaging results found for the past 2 days      Diagnostic study results (if any) were reviewed by PADMINI Zepeda.    Assessment/Plan   Allergies, medications, history, and pertinent labs/EKGs/Imaging reviewed by PADMINI Zepeda.     Medical Decision Making  Medical Decision Making  At time of discharge patient was clinically well-appearing and HDS for outpatient management. The patient and/or family was educated regarding diagnosis, supportive care, OTC and Rx medications. The patient and/or family was given the opportunity to ask questions prior to discharge.  They verbalized understanding of my discussion of the plans for treatment, expected course, indications to return to  or seek further evaluation in ED, and the need for timely follow up as directed.   They were provided with a work/school excuse if requested.        Orders and Diagnoses  Diagnoses and all orders for this visit:  Hypertension, unspecified type  -     carvedilol (Coreg) 25 mg tablet; Take 1 tablet (25 mg) by mouth 2 times daily (morning and late afternoon).  -     losartan-hydrochlorothiazide (Hyzaar) 100-25 mg tablet; Take 1 tablet by mouth once daily.  -      spironolactone (Aldactone) 50 mg tablet; Take 1 tablet (50 mg) by mouth once daily.  Benign essential hypertension  -     carvedilol (Coreg) 25 mg tablet; Take 1 tablet (25 mg) by mouth 2 times daily (morning and late afternoon).  -     losartan-hydrochlorothiazide (Hyzaar) 100-25 mg tablet; Take 1 tablet by mouth once daily.  -     spironolactone (Aldactone) 50 mg tablet; Take 1 tablet (50 mg) by mouth once daily.  -     Referral to Primary Care; Future    Return to Urgent care if symptoms return or progress  Follow up with PCP in 1-2 weeks       Patient disposition: Home    Electronically signed by PADMINI Zepeda  9:48 PM           [1] (Not in a hospital admission)  [2]   Past Medical History:  Diagnosis Date    Fibroid     Hypertension     Personal history of other endocrine, nutritional and metabolic disease 11/21/2016    History of obesity    Prediabetes 11/21/2016    Prediabetes    Unspecified disorder of nose and nasal sinuses 02/03/2016    Sinus trouble   [3]   Past Surgical History:  Procedure Laterality Date    CHOLECYSTECTOMY      DILATION AND CURETTAGE OF UTERUS  11/21/2016    Dilation And Curettage    HIP SURGERY  11/21/2016    Hip Surgery    MYOMECTOMY  2022    OTHER SURGICAL HISTORY  02/07/2016    hip surgery    OTHER SURGICAL HISTORY  2019    Eyelid surgery for ptosis

## 2025-09-04 ENCOUNTER — APPOINTMENT (OUTPATIENT)
Dept: PRIMARY CARE | Facility: CLINIC | Age: 40
End: 2025-09-04
Payer: MEDICARE

## (undated) DEVICE — ACCESSORY, AVETA, WASTE MANAGEMENT

## (undated) DEVICE — BRIEF, CURITY, XLARGE, MESH

## (undated) DEVICE — HYSTEROSCOPE, AVETA CORAL, 4.6MM

## (undated) DEVICE — TUBING, SUCTION, NON-CONDUCTIVE, W/CONNECT,.25 IN X 12 FT, STERILE, LF

## (undated) DEVICE — SOLIDIFIER, KWIK-SORB, 1200CC

## (undated) DEVICE — PAD, SANITARY, OBSTETRICAL, W/ADHSV STRIP,11 IN,LF

## (undated) DEVICE — PREP TRAY, VAGINAL

## (undated) DEVICE — DEVICE, RESECTING, AVETA WAVE, 3.9MM

## (undated) DEVICE — CAUTERY, PENCIL, PUSH BUTTON, SMOKE EVAC, 70MM

## (undated) DEVICE — ACCESSORY, FLUID MANAGEMENT, AVETA

## (undated) DEVICE — DEVICE, RESECTING, AVETA FLEX, 2.9MM

## (undated) DEVICE — COVER HANDLE LIGHT, STERIS, BLUE, STERILE

## (undated) DEVICE — Device

## (undated) DEVICE — SOLUTION, SCRUB EXIDINE, 4% CHG, 4 OZ